# Patient Record
Sex: MALE | Race: BLACK OR AFRICAN AMERICAN | Employment: UNEMPLOYED | URBAN - METROPOLITAN AREA
[De-identification: names, ages, dates, MRNs, and addresses within clinical notes are randomized per-mention and may not be internally consistent; named-entity substitution may affect disease eponyms.]

---

## 2021-06-27 ENCOUNTER — HOSPITAL ENCOUNTER (EMERGENCY)
Age: 46
Discharge: HOME | End: 2021-06-27
Attending: EMERGENCY MEDICINE

## 2021-06-27 ENCOUNTER — APPOINTMENT (OUTPATIENT)
Dept: CT IMAGING | Age: 46
End: 2021-06-27
Attending: EMERGENCY MEDICINE

## 2021-06-27 VITALS
BODY MASS INDEX: 24.8 KG/M2 | WEIGHT: 140 LBS | HEIGHT: 63 IN | HEART RATE: 99 BPM | OXYGEN SATURATION: 100 % | RESPIRATION RATE: 18 BRPM | SYSTOLIC BLOOD PRESSURE: 140 MMHG | TEMPERATURE: 98.4 F | DIASTOLIC BLOOD PRESSURE: 101 MMHG

## 2021-06-27 DIAGNOSIS — K52.9 COLITIS: Primary | ICD-10-CM

## 2021-06-27 DIAGNOSIS — R79.89 ABNORMAL LIVER FUNCTION TESTS: ICD-10-CM

## 2021-06-27 DIAGNOSIS — Z78.9 ALCOHOL USE: ICD-10-CM

## 2021-06-27 LAB
ALBUMIN SERPL-MCNC: 4.5 G/DL (ref 3.4–5)
ALBUMIN/GLOB SERPL: 1.1 {RATIO} (ref 0.8–1.7)
ALP SERPL-CCNC: 123 U/L (ref 45–117)
ALT SERPL-CCNC: 34 U/L (ref 16–61)
ANION GAP SERPL CALC-SCNC: 7 MMOL/L (ref 3–18)
APPEARANCE UR: CLEAR
APTT PPP: 24.4 SEC (ref 23–36.4)
AST SERPL-CCNC: 45 U/L (ref 10–38)
BACTERIA URNS QL MICRO: ABNORMAL /HPF
BASOPHILS # BLD: 0 K/UL (ref 0–0.1)
BASOPHILS NFR BLD: 1 % (ref 0–2)
BILIRUB DIRECT SERPL-MCNC: 0.4 MG/DL (ref 0–0.2)
BILIRUB SERPL-MCNC: 1.4 MG/DL (ref 0.2–1)
BILIRUB UR QL: NEGATIVE
BUN SERPL-MCNC: 7 MG/DL (ref 7–18)
BUN/CREAT SERPL: 8 (ref 12–20)
CALCIUM SERPL-MCNC: 8.8 MG/DL (ref 8.5–10.1)
CHLORIDE SERPL-SCNC: 108 MMOL/L (ref 100–111)
CO2 SERPL-SCNC: 27 MMOL/L (ref 21–32)
COLOR UR: YELLOW
CREAT SERPL-MCNC: 0.84 MG/DL (ref 0.6–1.3)
DIFFERENTIAL METHOD BLD: NORMAL
EOSINOPHIL # BLD: 0 K/UL (ref 0–0.4)
EOSINOPHIL NFR BLD: 0 % (ref 0–5)
EPITH CASTS URNS QL MICRO: ABNORMAL /LPF (ref 0–5)
ERYTHROCYTE [DISTWIDTH] IN BLOOD BY AUTOMATED COUNT: 13.4 % (ref 11.6–14.5)
ETHANOL SERPL-MCNC: 380 MG/DL (ref 0–3)
GLOBULIN SER CALC-MCNC: 4 G/DL (ref 2–4)
GLUCOSE BLD STRIP.AUTO-MCNC: 103 MG/DL (ref 70–110)
GLUCOSE SERPL-MCNC: 87 MG/DL (ref 74–99)
GLUCOSE UR STRIP.AUTO-MCNC: NEGATIVE MG/DL
HCT VFR BLD AUTO: 42.2 % (ref 36–48)
HGB BLD-MCNC: 14.8 G/DL (ref 13–16)
HGB UR QL STRIP: NEGATIVE
INR PPP: 1 (ref 0.8–1.2)
KETONES UR QL STRIP.AUTO: ABNORMAL MG/DL
LEUKOCYTE ESTERASE UR QL STRIP.AUTO: NEGATIVE
LIPASE SERPL-CCNC: 207 U/L (ref 73–393)
LYMPHOCYTES # BLD: 3.1 K/UL (ref 0.9–3.6)
LYMPHOCYTES NFR BLD: 51 % (ref 21–52)
MAGNESIUM SERPL-MCNC: 2.1 MG/DL (ref 1.6–2.6)
MCH RBC QN AUTO: 31.9 PG (ref 24–34)
MCHC RBC AUTO-ENTMCNC: 35.1 G/DL (ref 31–37)
MCV RBC AUTO: 90.9 FL (ref 74–97)
MONOCYTES # BLD: 0.4 K/UL (ref 0.05–1.2)
MONOCYTES NFR BLD: 7 % (ref 3–10)
NEUTS SEG # BLD: 2.5 K/UL (ref 1.8–8)
NEUTS SEG NFR BLD: 41 % (ref 40–73)
NITRITE UR QL STRIP.AUTO: NEGATIVE
PH UR STRIP: 5 [PH] (ref 5–8)
PLATELET # BLD AUTO: 177 K/UL (ref 135–420)
PMV BLD AUTO: 9.9 FL (ref 9.2–11.8)
POTASSIUM SERPL-SCNC: 3.9 MMOL/L (ref 3.5–5.5)
PROT SERPL-MCNC: 8.5 G/DL (ref 6.4–8.2)
PROT UR STRIP-MCNC: 30 MG/DL
PROTHROMBIN TIME: 12.7 SEC (ref 11.5–15.2)
RBC # BLD AUTO: 4.64 M/UL (ref 4.35–5.65)
RBC #/AREA URNS HPF: NEGATIVE /HPF (ref 0–5)
SODIUM SERPL-SCNC: 142 MMOL/L (ref 136–145)
SP GR UR REFRACTOMETRY: 1.02 (ref 1–1.03)
UROBILINOGEN UR QL STRIP.AUTO: 0.2 EU/DL (ref 0.2–1)
WBC # BLD AUTO: 6.1 K/UL (ref 4.6–13.2)
WBC URNS QL MICRO: ABNORMAL /HPF (ref 0–5)

## 2021-06-27 PROCEDURE — 83690 ASSAY OF LIPASE: CPT

## 2021-06-27 PROCEDURE — 96375 TX/PRO/DX INJ NEW DRUG ADDON: CPT

## 2021-06-27 PROCEDURE — 96365 THER/PROPH/DIAG IV INF INIT: CPT

## 2021-06-27 PROCEDURE — 82077 ASSAY SPEC XCP UR&BREATH IA: CPT

## 2021-06-27 PROCEDURE — 74177 CT ABD & PELVIS W/CONTRAST: CPT

## 2021-06-27 PROCEDURE — 83735 ASSAY OF MAGNESIUM: CPT

## 2021-06-27 PROCEDURE — 85610 PROTHROMBIN TIME: CPT

## 2021-06-27 PROCEDURE — 74011250636 HC RX REV CODE- 250/636: Performed by: EMERGENCY MEDICINE

## 2021-06-27 PROCEDURE — 82962 GLUCOSE BLOOD TEST: CPT

## 2021-06-27 PROCEDURE — 99284 EMERGENCY DEPT VISIT MOD MDM: CPT

## 2021-06-27 PROCEDURE — 85025 COMPLETE CBC W/AUTO DIFF WBC: CPT

## 2021-06-27 PROCEDURE — 81001 URINALYSIS AUTO W/SCOPE: CPT

## 2021-06-27 PROCEDURE — 74011000250 HC RX REV CODE- 250: Performed by: EMERGENCY MEDICINE

## 2021-06-27 PROCEDURE — 85730 THROMBOPLASTIN TIME PARTIAL: CPT

## 2021-06-27 PROCEDURE — 80048 BASIC METABOLIC PNL TOTAL CA: CPT

## 2021-06-27 PROCEDURE — 96366 THER/PROPH/DIAG IV INF ADDON: CPT

## 2021-06-27 PROCEDURE — 80076 HEPATIC FUNCTION PANEL: CPT

## 2021-06-27 PROCEDURE — 74011000636 HC RX REV CODE- 636: Performed by: EMERGENCY MEDICINE

## 2021-06-27 RX ORDER — METFORMIN HYDROCHLORIDE 500 MG/1
TABLET ORAL 2 TIMES DAILY WITH MEALS
COMMUNITY

## 2021-06-27 RX ORDER — ONDANSETRON 4 MG/1
4 TABLET, FILM COATED ORAL
Qty: 12 TABLET | Refills: 0 | Status: SHIPPED | OUTPATIENT
Start: 2021-06-27

## 2021-06-27 RX ORDER — PANTOPRAZOLE SODIUM 20 MG/1
20 TABLET, DELAYED RELEASE ORAL DAILY
COMMUNITY

## 2021-06-27 RX ORDER — SERTRALINE HYDROCHLORIDE 100 MG/1
TABLET, FILM COATED ORAL DAILY
COMMUNITY

## 2021-06-27 RX ORDER — MORPHINE SULFATE 4 MG/ML
4 INJECTION INTRAVENOUS
Status: COMPLETED | OUTPATIENT
Start: 2021-06-27 | End: 2021-06-27

## 2021-06-27 RX ORDER — CIPROFLOXACIN 500 MG/1
500 TABLET ORAL 2 TIMES DAILY
Qty: 14 TABLET | Refills: 0 | Status: SHIPPED | OUTPATIENT
Start: 2021-06-27 | End: 2021-07-04

## 2021-06-27 RX ORDER — DICYCLOMINE HYDROCHLORIDE 10 MG/1
20 CAPSULE ORAL
Qty: 50 CAPSULE | Refills: 0 | Status: SHIPPED | OUTPATIENT
Start: 2021-06-27

## 2021-06-27 RX ORDER — ATORVASTATIN CALCIUM 10 MG/1
TABLET, FILM COATED ORAL DAILY
COMMUNITY

## 2021-06-27 RX ORDER — ONDANSETRON 2 MG/ML
4 INJECTION INTRAMUSCULAR; INTRAVENOUS
Status: COMPLETED | OUTPATIENT
Start: 2021-06-27 | End: 2021-06-27

## 2021-06-27 RX ADMIN — IOPAMIDOL 100 ML: 612 INJECTION, SOLUTION INTRAVENOUS at 21:12

## 2021-06-27 RX ADMIN — FOLIC ACID: 5 INJECTION, SOLUTION INTRAMUSCULAR; INTRAVENOUS; SUBCUTANEOUS at 20:21

## 2021-06-27 RX ADMIN — MORPHINE SULFATE 4 MG: 4 INJECTION INTRAVENOUS at 20:08

## 2021-06-27 RX ADMIN — ONDANSETRON 4 MG: 2 INJECTION INTRAMUSCULAR; INTRAVENOUS at 20:06

## 2021-06-27 NOTE — ED TRIAGE NOTES
Pt states \" My stomach has been swelling and I am having a hard time breathing and I am diabetic with high blood pressure. I am new to the area and I need help. \"

## 2021-07-03 NOTE — ED PROVIDER NOTES
EMERGENCY DEPARTMENT HISTORY AND PHYSICAL EXAM    Date: 6/27/2021  Patient Name: Agnes Collier    History of Presenting Illness       Chief Complaint   Patient presents with    Abdominal Pain         History Provided By: Patient    Additional History (Context):   7:42 PM  Agnes Collier is a 48 y.o. male with PMHX of hypertension, diabetes, reflux, tobacco use who presents to the emergency department C/O belly pain. Patient describes a 10 out of 10 lower abdominal pain described as aching and tightness in the pelvic region. It does not radiate or aggravate or alleviate with food or drink or any kind of factors. He notices movement makes him feel uncomfortable but otherwise unremarkable. He is nauseous without vomiting. He had a few bouts of loose stools but denies any blood or melena or cathy diarrhea. He does acknowledge drinking alcohol with with some regularity in fact he was drinking tonight in order alleviate some of the pain. Social History  Acknowledges smoking both cigarettes and marijuana as well as drinking alcohol but denies any other illegal drugs. Family History  Negative for premature heart disease or colon cancer. Positive for high blood pressure and diabetes in his family      PCP: None    Current Outpatient Medications   Medication Sig Dispense Refill    LOSARTAN PO Take  by mouth.  sertraline (Zoloft) 100 mg tablet Take  by mouth daily.  atorvastatin (LIPITOR) 10 mg tablet Take  by mouth daily.  metFORMIN (GLUCOPHAGE) 500 mg tablet Take  by mouth two (2) times daily (with meals).  pantoprazole (Protonix) 20 mg tablet Take 20 mg by mouth daily.  ciprofloxacin HCl (Cipro) 500 mg tablet Take 1 Tablet by mouth two (2) times a day for 7 days. 14 Tablet 0    ondansetron hcl (Zofran) 4 mg tablet Take 1 Tablet by mouth every eight (8) hours as needed for Nausea.  12 Tablet 0    dicyclomine (BENTYL) 10 mg capsule Take 2 Capsules by mouth four (4) times daily as needed for Abdominal Cramps. 50 Capsule 0       Past History     Past Medical History:  Past Medical History:   Diagnosis Date    Diabetes ([address]     Gastric reflux     Hx of colonoscopy     Hypertension        Past Surgical History:  History reviewed. No pertinent surgical history. Family History:  History reviewed. No pertinent family history. Social History:  Social History     Tobacco Use    Smoking status: Current Every Day Smoker    Smokeless tobacco: Never Used   Substance Use Topics    Alcohol use: Yes     Alcohol/week: 2.0 standard drinks     Types: 2 Cans of beer per week    Drug use: Yes     Types: Marijuana       Allergies:  No Known Allergies      Review of Systems   Review of Systems   Gastrointestinal: Positive for abdominal pain and nausea. Negative for blood in stool and constipation. Endocrine: Negative for polydipsia and polyuria. Hematological: Does not bruise/bleed easily. All other systems reviewed and are negative. Physical Exam     Vitals:    06/27/21 1900   BP: (!) 140/101   Pulse: 99   Resp: 18   Temp: 98.4 °F (36.9 °C)   SpO2: 100%   Weight: 63.5 kg (140 lb)   Height: 5' 3\" (1.6 m)     Physical Exam  Vitals and nursing note reviewed. Constitutional:       General: He is not in acute distress. Appearance: He is well-developed. He is not diaphoretic. HENT:      Head: Normocephalic and atraumatic. Eyes:      General: No scleral icterus. Extraocular Movements:      Right eye: Nystagmus present. Normal extraocular motion. Left eye: Nystagmus present. Normal extraocular motion. Conjunctiva/sclera:      Right eye: Right conjunctiva is injected. Left eye: Left conjunctiva is injected. Pupils: Pupils are equal, round, and reactive to light. Neck:      Trachea: No tracheal deviation. Cardiovascular:      Rate and Rhythm: Normal rate and regular rhythm. Heart sounds: Normal heart sounds.    Pulmonary:      Effort: Pulmonary effort is normal. No respiratory distress. Breath sounds: Normal breath sounds. No stridor. Abdominal:      General: Bowel sounds are normal. There is no distension. Palpations: Abdomen is soft. Tenderness: There is no abdominal tenderness. There is no rebound. Musculoskeletal:         General: No tenderness. Normal range of motion. Cervical back: Normal range of motion and neck supple. Comments: Grossly unremarkable without abnormalities   Skin:     General: Skin is warm and dry. Capillary Refill: Capillary refill takes less than 2 seconds. Findings: No erythema or rash. Neurological:      Mental Status: He is alert and oriented to person, place, and time. GCS: GCS eye subscore is 4. GCS verbal subscore is 5. GCS motor subscore is 6. Cranial Nerves: No cranial nerve deficit. Motor: No weakness. Psychiatric:         Mood and Affect: Mood normal.         Behavior: Behavior normal.         Thought Content:  Thought content normal.         Judgment: Judgment normal.       Diagnostic Study Results     Labs -    Lab Results        Contains abnormal data URINALYSIS W/ RFLX MICROSCOPIC (Final result)   Component (Lab Inquiry)  Collection Time Result Time COLOR APPRN SPGRU KENNY PROTU GLUCU KETU BILU BLDU UROU   06/27/21 19:16:00 06/27/21 19:45:45 YELLOW CLEAR 1.017 5.0 30Abnormal  Negative TRACEAbnormal  Negative Negative 0.2       Collection Time Result Time ROMY LEUKU   06/27/21 19:16:00 06/27/21 19:45:45 Negative Negative       Final result                        Contains abnormal data URINE MICROSCOPIC ONLY (Final result)   Component (Lab Inquiry)  Collection Time Result Time WBCU RBCU EPSU BACTU   06/27/21 19:16:00 06/27/21 19:45:45 0 to 3 Negative FEW FEWAbnormal        Final result                        CBC WITH AUTOMATED DIFF (Final result)   Component (Lab Inquiry)  Collection Time Result Time WBC RBC HGB HCT MCV MCH MCHC RDW PLT MPLV   06/27/21 19:10:00 06/27/21 19:36:00 6.1 4.64 14.8 42.2 90.9 31.9 35.1 13.4 177 9.9       Collection Time Result Time GRANS LYMPH MONOS EOS BASOS ABG ABL ABM NANDO ABB   06/27/21 19:10:00 06/27/21 19:36:00 41 51 7 0 1 2.5 3.1 0.4 0.0 0.0       Collection Time Result Time DF   06/27/21 19:10:00 06/27/21 19:36:00 AUTOMATED         Final result                          Contains abnormal data METABOLIC PANEL, BASIC (Final result)   Component (Lab Inquiry)  Collection Time Result Time NA K CL CO2 AGAP GLU BUN CREA BUCR GFRAA   06/27/21 19:10:00 06/27/21 19:41:36 142 3.9 108 27 7 87 7 0.84 8Low  >60       Collection Time Result Time GFRNA CA   06/27/21 19:10:00 06/27/21 19:41:36 >60   (NOTE)   Estimated GFR . .. 8.8       Final result                        Contains abnormal data HEPATIC FUNCTION PANEL (Final result)   Component (Lab Inquiry)  Collection Time Result Time TP ALB GLOB AGRAT TBIL CBIL AP SGOT GPT   06/27/21 19:10:00 06/27/21 20:24:12 8.5High  4.5 4.0 1.1 1.4High  0.4High  123High  45High  34       Final result                        MAGNESIUM (Final result)   Component (Lab Inquiry)  Collection Time Result Time MG   06/27/21 19:10:00 06/27/21 20:24:12 2.1         Final result                          Contains critical data ETHYL ALCOHOL (Final result)   Component (Lab Inquiry)  Collection Time Result Time ALC   06/27/21 19:10:00 06/27/21 20:24:12 380   CALLED TO AND CORRECTL. [place] Melody Crofts High Panic         Final result                        PROTHROMBIN TIME + INR (Final result)   Component (Lab Inquiry)  Collection Time Result Time PTP INR   06/27/21 19:10:00 06/27/21 20:13:04 12.7 1.0   INR Therape. ..         Final result                          PTT (Final result)   Component (Lab Inquiry)  Collection Time Result Time APTT   06/27/21 19:10:00 06/27/21 20:13:04 24.4         Final result                          LIPASE (Final result)   Component (Lab Inquiry)  Collection Time Result Time LPSE   06/27/21 19:10:00 06/27/21 20:45:38 207         Final result           GLUCOSE, POC (Final result)   Component (Lab Inquiry)  Collection Time Result Time GULPOC   06/27/21 19:03:00 06/27/21 19:04:40 103   (NOTE)   The FDA has in. .. Radiologic Studies -   CT ABD PELV W CONT   Final Result      There is mild colonic wall thickening of the transverse and descending colon   which may reflect under distention versus colitis. Mild wall thickening of the antrum of the stomach which may reflect peptic   disease. Colonic diverticulosis. L5 pars defects bilaterally with grade 1 anterolisthesis. CT Results  (Last 48 hours)    None        CXR Results  (Last 48 hours)    None          Medications given in the ED-  Medications   morphine injection 4 mg (4 mg IntraVENous Given 6/27/21 2008)   ondansetron (ZOFRAN) injection 4 mg (4 mg IntraVENous Given 6/27/21 2006)   0.9% sodium chloride 1,000 mL with mvi (adult no. 4 with vit K) 10 mL, thiamine 084 mg, folic acid 1 mg infusion ( IntraVENous IV Completed 6/27/21 2325)   iopamidoL (ISOVUE 300) 61 % contrast injection  mL (100 mL IntraVENous Given 6/27/21 2112)         Medical Decision Making   I am the first provider for this patient. I reviewed the vital signs, available nursing notes, past medical history, past surgical history, family history and social history. Vital Signs-Reviewed the patient's vital signs. Pulse Oximetry Analysis - 100% on room air    Cardiac Monitor:  Rate: 95 bpm  Rhythm: Sinus rhythm    Records Reviewed: NURSING NOTES AND PREVIOUS MEDICAL RECORDS    Provider Notes (Medical Decision Making): Anxious nervous gentleman with epigastric pain pelvic left lower quadrant pain. He has conjunctival injection nystagmus smells of alcohol likely from the reason for his gastritis. Given his age and diabetes location of pain CAT scan was ordered which showed some evidence of colitis but this more likely the underdistention as opposed to true pathology.   He was given prescription medications advised to curtail his indulgence that he was given information for obtaining new doctors in the area. It is unlikely this represents ACS pulmonary embolism perforation. He was given forewarning regarding the changes in his liver function associated with regular alcohol consumption    Procedures:  Procedures    ED Course:   7:42 PM: Initial assessment performed. The patients presenting problems have been discussed, and they are in agreement with the care plan formulated and outlined with them. I have encouraged them to ask questions as they arise throughout their visit. Diagnosis and Disposition       DISCHARGE NOTE:  11:04 PM  Polly Naranjo  results have been reviewed with him. He has been counseled regarding his diagnosis, treatment, and plan. He verbally conveys understanding and agreement of the signs, symptoms, diagnosis, treatment and prognosis and additionally agrees to follow up as discussed. He also agrees with the care-plan and conveys that all of his questions have been answered. I have also provided discharge instructions for him that include: educational information regarding their diagnosis and treatment, and list of reasons why they would want to return to the ED prior to their follow-up appointment, should his condition change. He has been provided with education for proper emergency department utilization. CLINICAL IMPRESSION:    1. Colitis    2. Alcohol use    3. Abnormal liver function tests        PLAN:  1. D/C Home  2. Discharge Medication List as of 6/27/2021 11:10 PM      START taking these medications    Details   ciprofloxacin HCl (Cipro) 500 mg tablet Take 1 Tablet by mouth two (2) times a day for 7 days. , Normal, Disp-14 Tablet, R-0      ondansetron hcl (Zofran) 4 mg tablet Take 1 Tablet by mouth every eight (8) hours as needed for Nausea., Normal, Disp-12 Tablet, R-0      dicyclomine (BENTYL) 10 mg capsule Take 2 Capsules by mouth four (4) times daily as needed for Abdominal Cramps., Normal, Disp-50 Capsule, R-0         CONTINUE these medications which have NOT CHANGED    Details   LOSARTAN PO Take  by mouth., Historical Med      sertraline (Zoloft) 100 mg tablet Take  by mouth daily. , Historical Med      atorvastatin (LIPITOR) 10 mg tablet Take  by mouth daily. , Historical Med      metFORMIN (GLUCOPHAGE) 500 mg tablet Take  by mouth two (2) times daily (with meals). , Historical Med      pantoprazole (Protonix) 20 mg tablet Take 20 mg by mouth daily. , Historical Med           3. Follow-up Information     Follow up With Specialties Details Why Contact Info    [address] [facility]  In 3 days  [address]  In 3 days  [facility] [address] [phone]    [facility] EMERGENCY DEPT Emergency Medicine  As needed [address] [address]  [phone]        _______________________________    This note was partially transcribed via voice recognition software. Although efforts have been made to catch any discrepancies, it may contain sound alike words, grammatical errors, or nonsensical words.

## 2021-07-13 ENCOUNTER — APPOINTMENT (OUTPATIENT)
Dept: CT IMAGING | Age: 46
End: 2021-07-13
Attending: EMERGENCY MEDICINE

## 2021-07-13 ENCOUNTER — HOSPITAL ENCOUNTER (EMERGENCY)
Age: 46
Discharge: HOME | End: 2021-07-13
Attending: EMERGENCY MEDICINE

## 2021-07-13 VITALS
WEIGHT: 140 LBS | BODY MASS INDEX: 24.8 KG/M2 | RESPIRATION RATE: 16 BRPM | DIASTOLIC BLOOD PRESSURE: 99 MMHG | HEART RATE: 82 BPM | HEIGHT: 63 IN | TEMPERATURE: 97.8 F | SYSTOLIC BLOOD PRESSURE: 142 MMHG | OXYGEN SATURATION: 100 %

## 2021-07-13 DIAGNOSIS — R10.9 LEFT FLANK PAIN: Primary | ICD-10-CM

## 2021-07-13 DIAGNOSIS — M54.9 MUSCULOSKELETAL BACK PAIN: ICD-10-CM

## 2021-07-13 DIAGNOSIS — F10.10 ALCOHOL ABUSE: ICD-10-CM

## 2021-07-13 LAB
ALBUMIN SERPL-MCNC: 3.5 G/DL (ref 3.4–5)
ALBUMIN/GLOB SERPL: 0.9 {RATIO} (ref 0.8–1.7)
ALP SERPL-CCNC: 115 U/L (ref 45–117)
ALT SERPL-CCNC: 32 U/L (ref 16–61)
AMPHET UR QL SCN: NEGATIVE
ANION GAP SERPL CALC-SCNC: 12 MMOL/L (ref 3–18)
APPEARANCE UR: CLEAR
AST SERPL-CCNC: 46 U/L (ref 10–38)
BARBITURATES UR QL SCN: NEGATIVE
BASOPHILS # BLD: 0 K/UL (ref 0–0.1)
BASOPHILS NFR BLD: 1 % (ref 0–2)
BENZODIAZ UR QL: NEGATIVE
BILIRUB SERPL-MCNC: 0.7 MG/DL (ref 0.2–1)
BILIRUB UR QL: NEGATIVE
BUN SERPL-MCNC: 9 MG/DL (ref 7–18)
BUN/CREAT SERPL: 9 (ref 12–20)
CALCIUM SERPL-MCNC: 8.4 MG/DL (ref 8.5–10.1)
CANNABINOIDS UR QL SCN: NEGATIVE
CHLORIDE SERPL-SCNC: 112 MMOL/L (ref 100–111)
CK SERPL-CCNC: 455 U/L (ref 39–308)
CO2 SERPL-SCNC: 21 MMOL/L (ref 21–32)
COCAINE UR QL SCN: NEGATIVE
COLOR UR: YELLOW
CREAT SERPL-MCNC: 0.99 MG/DL (ref 0.6–1.3)
DIFFERENTIAL METHOD BLD: ABNORMAL
EOSINOPHIL # BLD: 0.1 K/UL (ref 0–0.4)
EOSINOPHIL NFR BLD: 1 % (ref 0–5)
ERYTHROCYTE [DISTWIDTH] IN BLOOD BY AUTOMATED COUNT: 14.4 % (ref 11.6–14.5)
GLOBULIN SER CALC-MCNC: 3.7 G/DL (ref 2–4)
GLUCOSE BLD STRIP.AUTO-MCNC: 117 MG/DL (ref 70–110)
GLUCOSE SERPL-MCNC: 122 MG/DL (ref 74–99)
GLUCOSE UR STRIP.AUTO-MCNC: NEGATIVE MG/DL
HCT VFR BLD AUTO: 36.4 % (ref 36–48)
HDSCOM,HDSCOM: NORMAL
HGB BLD-MCNC: 12.6 G/DL (ref 13–16)
HGB UR QL STRIP: NEGATIVE
KETONES UR QL STRIP.AUTO: NEGATIVE MG/DL
LEUKOCYTE ESTERASE UR QL STRIP.AUTO: NEGATIVE
LIPASE SERPL-CCNC: 365 U/L (ref 73–393)
LYMPHOCYTES # BLD: 2.7 K/UL (ref 0.9–3.6)
LYMPHOCYTES NFR BLD: 55 % (ref 21–52)
MCH RBC QN AUTO: 31.8 PG (ref 24–34)
MCHC RBC AUTO-ENTMCNC: 34.6 G/DL (ref 31–37)
MCV RBC AUTO: 91.9 FL (ref 74–97)
METHADONE UR QL: NEGATIVE
MONOCYTES # BLD: 0.5 K/UL (ref 0.05–1.2)
MONOCYTES NFR BLD: 11 % (ref 3–10)
NEUTS SEG # BLD: 1.6 K/UL (ref 1.8–8)
NEUTS SEG NFR BLD: 32 % (ref 40–73)
NITRITE UR QL STRIP.AUTO: NEGATIVE
OPIATES UR QL: NEGATIVE
PCP UR QL: NEGATIVE
PH UR STRIP: 5.5 [PH] (ref 5–8)
PLATELET # BLD AUTO: 166 K/UL (ref 135–420)
PMV BLD AUTO: 10 FL (ref 9.2–11.8)
POTASSIUM SERPL-SCNC: 3.7 MMOL/L (ref 3.5–5.5)
PROT SERPL-MCNC: 7.2 G/DL (ref 6.4–8.2)
PROT UR STRIP-MCNC: NEGATIVE MG/DL
RBC # BLD AUTO: 3.96 M/UL (ref 4.35–5.65)
SODIUM SERPL-SCNC: 145 MMOL/L (ref 136–145)
SP GR UR REFRACTOMETRY: 1.01 (ref 1–1.03)
UROBILINOGEN UR QL STRIP.AUTO: 1 EU/DL (ref 0.2–1)
WBC # BLD AUTO: 4.9 K/UL (ref 4.6–13.2)

## 2021-07-13 PROCEDURE — 96365 THER/PROPH/DIAG IV INF INIT: CPT

## 2021-07-13 PROCEDURE — 82550 ASSAY OF CK (CPK): CPT

## 2021-07-13 PROCEDURE — 81003 URINALYSIS AUTO W/O SCOPE: CPT

## 2021-07-13 PROCEDURE — 82962 GLUCOSE BLOOD TEST: CPT

## 2021-07-13 PROCEDURE — 74011250636 HC RX REV CODE- 250/636: Performed by: EMERGENCY MEDICINE

## 2021-07-13 PROCEDURE — 74176 CT ABD & PELVIS W/O CONTRAST: CPT

## 2021-07-13 PROCEDURE — 80053 COMPREHEN METABOLIC PANEL: CPT

## 2021-07-13 PROCEDURE — 74011000250 HC RX REV CODE- 250: Performed by: EMERGENCY MEDICINE

## 2021-07-13 PROCEDURE — 85025 COMPLETE CBC W/AUTO DIFF WBC: CPT

## 2021-07-13 PROCEDURE — 99285 EMERGENCY DEPT VISIT HI MDM: CPT

## 2021-07-13 PROCEDURE — 83690 ASSAY OF LIPASE: CPT

## 2021-07-13 PROCEDURE — 80307 DRUG TEST PRSMV CHEM ANLYZR: CPT

## 2021-07-13 PROCEDURE — 96375 TX/PRO/DX INJ NEW DRUG ADDON: CPT

## 2021-07-13 RX ORDER — MORPHINE SULFATE 2 MG/ML
2 INJECTION, SOLUTION INTRAMUSCULAR; INTRAVENOUS
Status: COMPLETED | OUTPATIENT
Start: 2021-07-13 | End: 2021-07-13

## 2021-07-13 RX ORDER — METHOCARBAMOL 500 MG/1
1000 TABLET, FILM COATED ORAL 3 TIMES DAILY
Qty: 30 TABLET | Refills: 0 | Status: SHIPPED | OUTPATIENT
Start: 2021-07-13

## 2021-07-13 RX ADMIN — SODIUM CHLORIDE 1000 ML: 900 INJECTION, SOLUTION INTRAVENOUS at 03:45

## 2021-07-13 RX ADMIN — MORPHINE SULFATE 2 MG: 2 INJECTION, SOLUTION INTRAMUSCULAR; INTRAVENOUS at 04:35

## 2021-07-13 RX ADMIN — FOLIC ACID: 5 INJECTION, SOLUTION INTRAMUSCULAR; INTRAVENOUS; SUBCUTANEOUS at 04:31

## 2021-07-13 NOTE — ED PROVIDER NOTES
EMERGENCY DEPARTMENT HISTORY AND PHYSICAL EXAM    Date: 7/13/2021  Patient Name: Manas Krishnamurthy    History of Presenting Illness     Chief Complaint   Patient presents with    Flank Pain         History Provided By: Patient and EMS    Additional History (Context):   3:28 AM  Manas Krishnamurthy is a 48 y.o. male with PMHX of hypertension, diabetes, reflux who presents to the emergency department C/O flank pain. Patient was brought in by EMS who originally called for patient with chest pain. I discovered gentleman with flank pain rating to his groin with urinary urgency but and unable to void. He was sent home cardiac monitor which showed normal sinus rhythm without tachycardia or ectopy or ST segment changes. He was given IV fluids Toradol 15 mg x 2 and Zofran during transport. He is tearful and anxious with left flank pain ranks 10 out of 10. Pain is sharp radiating from flank to groin without increasing or decreasing factors. I saw the same gentleman a few weeks ago on June 27 with similar symptoms and was discharged with diagnosis of colitis after CT showed thickening of transverse and descending colon had an alcohol level of 380. Additionally gastritis findings on CT. Patient denies any chest pain shortness of breath or nausea vomiting. Social History  Patient acknowledges smoking cigarettes and drinking alcohol regularly. Family History  Negative for premature heart disease      PCP: None    Current Outpatient Medications   Medication Sig Dispense Refill    methocarbamoL (Robaxin) 500 mg tablet Take 2 Tablets by mouth three (3) times daily. 30 Tablet 0    LOSARTAN PO Take  by mouth.  sertraline (Zoloft) 100 mg tablet Take  by mouth daily.  atorvastatin (LIPITOR) 10 mg tablet Take  by mouth daily.  metFORMIN (GLUCOPHAGE) 500 mg tablet Take  by mouth two (2) times daily (with meals).  pantoprazole (Protonix) 20 mg tablet Take 20 mg by mouth daily.       ondansetron hcl (Zofran) 4 mg tablet Take 1 Tablet by mouth every eight (8) hours as needed for Nausea. 12 Tablet 0    dicyclomine (BENTYL) 10 mg capsule Take 2 Capsules by mouth four (4) times daily as needed for Abdominal Cramps. 50 Capsule 0       Past History     Past Medical History:  Past Medical History:   Diagnosis Date    Diabetes ([address]     Gastric reflux     Hx of colonoscopy     Hypertension        Past Surgical History:  History reviewed. No pertinent surgical history. Family History:  History reviewed. No pertinent family history. Social History:  Social History     Tobacco Use    Smoking status: Current Every Day Smoker    Smokeless tobacco: Never Used   Substance Use Topics    Alcohol use: Yes     Alcohol/week: 2.0 standard drinks     Types: 2 Cans of beer per week    Drug use: Yes     Types: Marijuana       Allergies: Allergies   Allergen Reactions    Garlic Other (comments)         Review of Systems   Review of Systems   Constitutional: Negative. Respiratory: Negative for shortness of breath. Cardiovascular: Negative for chest pain. Genitourinary: Positive for flank pain and urgency. Negative for dysuria and hematuria. Urine is \"dark-colored\"   Musculoskeletal: Positive for back pain. Psychiatric/Behavioral:        Daily alcohol and marijuana drug use   All other systems reviewed and are negative. Physical Exam     Vitals:    07/13/21 0317 07/13/21 0400 07/13/21 0430 07/13/21 0459   BP:  130/79 (!) 142/99    Pulse:    82   Resp:    16   Temp:       SpO2: 98% 97% 98% 100%   Weight:       Height:         Physical Exam  Vitals and nursing note reviewed. Constitutional:       General: He is not in acute distress. Appearance: He is well-developed. He is not diaphoretic. HENT:      Head: Normocephalic and atraumatic. Eyes:      General: No scleral icterus. Extraocular Movements:      Right eye: Nystagmus present. Normal extraocular motion. Left eye: Nystagmus present.  Normal extraocular motion. Conjunctiva/sclera:      Right eye: Right conjunctiva is injected. Left eye: Left conjunctiva is injected. Pupils: Pupils are equal, round, and reactive to light. Neck:      Trachea: No tracheal deviation. Cardiovascular:      Rate and Rhythm: Normal rate and regular rhythm. Heart sounds: Normal heart sounds. Pulmonary:      Effort: Pulmonary effort is normal. No respiratory distress. Breath sounds: Normal breath sounds. No stridor. Abdominal:      General: Bowel sounds are normal. There is no distension. Palpations: Abdomen is soft. Tenderness: There is no abdominal tenderness. There is no rebound. Musculoskeletal:         General: No tenderness. Normal range of motion. Cervical back: Normal range of motion and neck supple. Comments: Grossly unremarkable without abnormalities   Skin:     General: Skin is warm and dry. Capillary Refill: Capillary refill takes less than 2 seconds. Findings: No erythema or rash. Neurological:      Mental Status: He is alert and oriented to person, place, and time. GCS: GCS eye subscore is 4. GCS verbal subscore is 5. GCS motor subscore is 6. Cranial Nerves: No cranial nerve deficit. Motor: No weakness, tremor, atrophy or seizure activity. Psychiatric:         Attention and Perception: Attention normal.         Mood and Affect: Mood is anxious. Affect is tearful. Speech: Speech is slurred. Behavior: Behavior normal. Behavior is cooperative. Thought Content: Thought content normal. Thought content is not paranoid. Thought content does not include homicidal or suicidal ideation.          Judgment: Judgment normal.       Diagnostic Study Results     Labs -  Recent Results (from the past 24 hour(s))   CBC WITH AUTOMATED DIFF    Collection Time: 07/13/21  3:14 AM   Result Value Ref Range    WBC 4.9 4.6 - 13.2 K/uL    RBC 3.96 (L) 4.35 - 5.65 M/uL    HGB 12.6 (L) 13.0 - 16.0 g/dL    HCT 36.4 36.0 - 48.0 %    MCV 91.9 74.0 - 97.0 FL    MCH 31.8 24.0 - 34.0 PG    MCHC 34.6 31.0 - 37.0 g/dL    RDW 14.4 11.6 - 14.5 %    PLATELET 576 696 - 415 K/uL    MPV 10.0 9.2 - 11.8 FL    NEUTROPHILS 32 (L) 40 - 73 %    LYMPHOCYTES 55 (H) 21 - 52 %    MONOCYTES 11 (H) 3 - 10 %    EOSINOPHILS 1 0 - 5 %    BASOPHILS 1 0 - 2 %    ABS. NEUTROPHILS 1.6 (L) 1.8 - 8.0 K/UL    ABS. LYMPHOCYTES 2.7 0.9 - 3.6 K/UL    ABS. MONOCYTES 0.5 0.05 - 1.2 K/UL    ABS. EOSINOPHILS 0.1 0.0 - 0.4 K/UL    ABS. BASOPHILS 0.0 0.0 - 0.1 K/UL    DF AUTOMATED     METABOLIC PANEL, COMPREHENSIVE    Collection Time: 07/13/21  3:14 AM   Result Value Ref Range    Sodium 145 136 - 145 mmol/L    Potassium 3.7 3.5 - 5.5 mmol/L    Chloride 112 (H) 100 - 111 mmol/L    CO2 21 21 - 32 mmol/L    Anion gap 12 3.0 - 18 mmol/L    Glucose 122 (H) 74 - 99 mg/dL    BUN 9 7.0 - 18 MG/DL    Creatinine 0.99 0.6 - 1.3 MG/DL    BUN/Creatinine ratio 9 (L) 12 - 20      GFR est AA >60 >60 ml/min/1.73m2    GFR est non-AA >60 >60 ml/min/1.73m2    Calcium 8.4 (L) 8.5 - 10.1 MG/DL    Bilirubin, total 0.7 0.2 - 1.0 MG/DL    ALT (SGPT) 32 16 - 61 U/L    AST (SGOT) 46 (H) 10 - 38 U/L    Alk.  phosphatase 115 45 - 117 U/L    Protein, total 7.2 6.4 - 8.2 g/dL    Albumin 3.5 3.4 - 5.0 g/dL    Globulin 3.7 2.0 - 4.0 g/dL    A-G Ratio 0.9 0.8 - 1.7     LIPASE    Collection Time: 07/13/21  3:14 AM   Result Value Ref Range    Lipase 365 73 - 393 U/L   CK    Collection Time: 07/13/21  3:14 AM   Result Value Ref Range     (H) 39 - 308 U/L   GLUCOSE, POC    Collection Time: 07/13/21  3:15 AM   Result Value Ref Range    Glucose (POC) 117 (H) 70 - 110 mg/dL   URINALYSIS W/ RFLX MICROSCOPIC    Collection Time: 07/13/21  4:00 AM   Result Value Ref Range    Color YELLOW      Appearance CLEAR      Specific gravity 1.010 1.005 - 1.030      pH (UA) 5.5 5.0 - 8.0      Protein Negative NEG mg/dL    Glucose Negative NEG mg/dL    Ketone Negative NEG mg/dL    Bilirubin Negative NEG      Blood Negative NEG      Urobilinogen 1.0 0.2 - 1.0 EU/dL    Nitrites Negative NEG      Leukocyte Esterase Negative NEG     DRUG SCREEN, URINE    Collection Time: 07/13/21  4:00 AM   Result Value Ref Range    BENZODIAZEPINES Negative NEG      BARBITURATES Negative NEG      THC (TH-CANNABINOL) Negative NEG      OPIATES Negative NEG      PCP(PHENCYCLIDINE) Negative NEG      COCAINE Negative NEG      AMPHETAMINES Negative NEG      METHADONE Negative NEG      HDSCOM (NOTE)         Radiologic Studies -   CT ABD PELV WO CONT   Final Result      No renal or ureteral calculi, no signs of obstructive uropathy. No bowel dilatation or acute inflammatory process. Hepatic steatosis. CT Results  (Last 48 hours)               07/13/21 0435  CT ABD PELV WO CONT Final result    Impression:      No renal or ureteral calculi, no signs of obstructive uropathy. No bowel dilatation or acute inflammatory process. Hepatic steatosis. Narrative:  EXAM: CT of the abdomen and pelvis       INDICATION: Left lower quadrant pain, nausea, left flank pain x2 days. COMPARISON: 6/27/2021       TECHNIQUE: Helical volumetric scanning through the abdomen and pelvis was   performed without intravenous contrast.  Axial, coronal and sagittal   reconstructions were created. One or more dose reduction techniques were used on   this CT: automated exposure control, adjustment of the mAs and/or kVp according   to patient size, and iterative reconstruction techniques. The specific   techniques used on this CT exam have been documented in the patient's electronic   medical record. Digital Imaging and Communications in Medicine (DICOM) format   image data are available to nonaffiliated external healthcare facilities or   entities on a secure, media free, reciprocally searchable basis with patient   authorization for at least a 12-month period after this study.                    _______________ FINDINGS:       LOWER CHEST: Within normal limits. LIVER, BILIARY: Liver is diffusely low density relative to skeletal muscle   compatible with hepatic steatosis, with focal greater degree of fatty   infiltration in segment 4 of the liver adjacent to falciform ligament. No   suspicious liver lesion. No biliary dilation. Gallbladder is unremarkable. PANCREAS: Normal.       SPLEEN: Normal.       ADRENALS: Normal.       KIDNEYS: Normal.       LYMPH NODES: No enlarged lymph nodes. GASTROINTESTINAL TRACT: No bowel dilation or wall thickening. Normal appendix. Previous left colon and gastric antrum wall thickening are now longer   appreciated. PELVIC ORGANS: Urinary bladder is nearly completely empty with moderate wall   thickening. VASCULATURE: Aortic calcific atherosclerosis is redemonstrated. No AAA. BONES: No acute or aggressive osseous abnormalities identified. Bilateral L5   pars defects with grade 1 L5-S1 anterolisthesis again noted. OTHER: None.       _______________               CXR Results  (Last 48 hours)    None          Medications given in the ED-  Medications   sodium chloride 0.9 % bolus infusion 1,000 mL (0 mL IntraVENous IV Completed 7/13/21 2590)   0.9% sodium chloride 1,000 mL with mvi (adult no. 4 with vit K) 10 mL, thiamine 395 mg, folic acid 1 mg infusion ( IntraVENous IV Completed 7/13/21 4039)   morphine injection 2 mg (2 mg IntraVENous Given 7/13/21 7064)         Medical Decision Making   I am the first provider for this patient. I reviewed the vital signs, available nursing notes, past medical history, past surgical history, family history and social history. Vital Signs-Reviewed the patient's vital signs.     Pulse Oximetry Analysis - 100% on room air    Cardiac Monitor:  Rate: 86 bpm  Rhythm: Sinus rhythm      Records Reviewed: NURSING NOTES AND PREVIOUS MEDICAL RECORDS    Provider Notes (Medical Decision Making):   Patient transported with presumptive diagnosis of kidney stone. Is unlikely this represents ACS or pulmonary embolism. CT was repeated to look for evidence of perforation or new kidney stones or vascular injury. After Toradol pain was relieved that M dose of morphine but nursing staff found him sleeping. Eventually he woke up and did ask for pain medication symptoms resolved after 2 mg of morphine. Patient discharge at 6 AM in good condition walking without assistance. Procedures:  Procedures    ED Course:   3:28 AM : Initial assessment performed. The patients presenting problems have been discussed, and they are in agreement with the care plan formulated and outlined with them. I have encouraged them to ask questions as they arise throughout their visit. Diagnosis and Disposition       DISCHARGE NOTE:  5:51 AM  Chilo Ramesh  results have been reviewed with him. He has been counseled regarding his diagnosis, treatment, and plan. He verbally conveys understanding and agreement of the signs, symptoms, diagnosis, treatment and prognosis and additionally agrees to follow up as discussed. He also agrees with the care-plan and conveys that all of his questions have been answered. I have also provided discharge instructions for him that include: educational information regarding their diagnosis and treatment, and list of reasons why they would want to return to the ED prior to their follow-up appointment, should his condition change. He has been provided with education for proper emergency department utilization. CLINICAL IMPRESSION:    1. Left flank pain    2. Alcohol abuse    3. Musculoskeletal back pain        PLAN:  1. D/C Home  2. Current Discharge Medication List      START taking these medications    Details   methocarbamoL (Robaxin) 500 mg tablet Take 2 Tablets by mouth three (3) times daily. Qty: 30 Tablet, Refills: 0  Start date: 7/13/2021           3.    Follow-up Information     Follow up With Specialties Details Why [address]  In 1 week  [id number] [facility]  [phone]        _______________________________    This note was partially transcribed via voice recognition software. Although efforts have been made to catch any discrepancies, it may contain sound alike words, grammatical errors, or nonsensical words.

## 2021-07-13 NOTE — ED TRIAGE NOTES
Patient c/o LLQ abdominal pain, nausea, and left sided flank pain x2 days.  30mg IV toradol and 4mg IV zofran administered by ems

## 2021-10-06 ENCOUNTER — APPOINTMENT (OUTPATIENT)
Dept: GENERAL RADIOLOGY | Age: 46
End: 2021-10-06
Attending: EMERGENCY MEDICINE

## 2021-10-06 ENCOUNTER — HOSPITAL ENCOUNTER (EMERGENCY)
Age: 46
Discharge: HOME | End: 2021-10-06
Attending: EMERGENCY MEDICINE

## 2021-10-06 VITALS
HEIGHT: 63 IN | RESPIRATION RATE: 18 BRPM | OXYGEN SATURATION: 98 % | HEART RATE: 90 BPM | SYSTOLIC BLOOD PRESSURE: 134 MMHG | DIASTOLIC BLOOD PRESSURE: 81 MMHG | TEMPERATURE: 98 F | WEIGHT: 140 LBS | BODY MASS INDEX: 24.8 KG/M2

## 2021-10-06 DIAGNOSIS — F10.920 ALCOHOLIC INTOXICATION WITHOUT COMPLICATION (HCC): ICD-10-CM

## 2021-10-06 DIAGNOSIS — M25.562 ACUTE PAIN OF LEFT KNEE: Primary | ICD-10-CM

## 2021-10-06 PROCEDURE — 99282 EMERGENCY DEPT VISIT SF MDM: CPT

## 2021-10-06 PROCEDURE — 74011250637 HC RX REV CODE- 250/637: Performed by: EMERGENCY MEDICINE

## 2021-10-06 PROCEDURE — 73564 X-RAY EXAM KNEE 4 OR MORE: CPT

## 2021-10-06 RX ORDER — ACETAMINOPHEN 325 MG/1
650 TABLET ORAL
Status: COMPLETED | OUTPATIENT
Start: 2021-10-06 | End: 2021-10-06

## 2021-10-06 RX ADMIN — ACETAMINOPHEN 650 MG: 325 TABLET ORAL at 03:50
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
## 2021-10-06 NOTE — ED TRIAGE NOTES
Pt states he was walking down steps when he felt a pop in his left knee. C/o pain to same, states he is unable to apply pressure to extremity.

## 2021-10-06 NOTE — ED NOTES
Knee immobilizer applied and crutches given. I have reviewed discharge instructions with the patient. The patient verbalized understanding.

## 2021-12-24 ENCOUNTER — APPOINTMENT (OUTPATIENT)
Dept: GENERAL RADIOLOGY | Age: 46
End: 2021-12-24
Attending: EMERGENCY MEDICINE

## 2021-12-24 ENCOUNTER — HOSPITAL ENCOUNTER (EMERGENCY)
Age: 46
Discharge: HOME | End: 2021-12-24
Attending: EMERGENCY MEDICINE

## 2021-12-24 VITALS
OXYGEN SATURATION: 97 % | HEIGHT: 68 IN | HEART RATE: 97 BPM | SYSTOLIC BLOOD PRESSURE: 115 MMHG | BODY MASS INDEX: 20.76 KG/M2 | DIASTOLIC BLOOD PRESSURE: 82 MMHG | WEIGHT: 137 LBS | RESPIRATION RATE: 18 BRPM | TEMPERATURE: 98.4 F

## 2021-12-24 DIAGNOSIS — M25.562 ACUTE PAIN OF LEFT KNEE: Primary | ICD-10-CM

## 2021-12-24 PROCEDURE — 74011250636 HC RX REV CODE- 250/636: Performed by: EMERGENCY MEDICINE

## 2021-12-24 PROCEDURE — 96372 THER/PROPH/DIAG INJ SC/IM: CPT

## 2021-12-24 PROCEDURE — 73564 X-RAY EXAM KNEE 4 OR MORE: CPT

## 2021-12-24 PROCEDURE — 99284 EMERGENCY DEPT VISIT MOD MDM: CPT

## 2021-12-24 RX ORDER — KETOROLAC TROMETHAMINE 30 MG/ML
60 INJECTION, SOLUTION INTRAMUSCULAR; INTRAVENOUS
Status: COMPLETED | OUTPATIENT
Start: 2021-12-24 | End: 2021-12-24

## 2021-12-24 RX ADMIN — KETOROLAC TROMETHAMINE 60 MG: 30 INJECTION, SOLUTION INTRAMUSCULAR at 04:28
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
## 2021-12-30 ENCOUNTER — HOSPITAL ENCOUNTER (EMERGENCY)
Age: 46
Discharge: LEFT BEFORE DISCHARGE | End: 2021-12-30

## 2021-12-30 VITALS
TEMPERATURE: 97.8 F | RESPIRATION RATE: 16 BRPM | DIASTOLIC BLOOD PRESSURE: 90 MMHG | HEART RATE: 98 BPM | SYSTOLIC BLOOD PRESSURE: 144 MMHG | OXYGEN SATURATION: 99 %

## 2021-12-30 NOTE — ED TRIAGE NOTES
\" I have a callous on both my big toes that hurt and some right knee pain that's been going on for 3 months. I wear a brace but it don't seem to help, my knee is acting super duper stupid tonight\".  Vss nad noted

## 2022-01-16 ENCOUNTER — HOSPITAL ENCOUNTER (EMERGENCY)
Age: 47
Discharge: HOME | End: 2022-01-16
Attending: EMERGENCY MEDICINE
Payer: MEDICAID

## 2022-01-16 ENCOUNTER — APPOINTMENT (OUTPATIENT)
Dept: CT IMAGING | Age: 47
End: 2022-01-16
Attending: EMERGENCY MEDICINE
Payer: MEDICAID

## 2022-01-16 ENCOUNTER — APPOINTMENT (OUTPATIENT)
Dept: GENERAL RADIOLOGY | Age: 47
End: 2022-01-16
Attending: EMERGENCY MEDICINE
Payer: MEDICAID

## 2022-01-16 VITALS
SYSTOLIC BLOOD PRESSURE: 156 MMHG | TEMPERATURE: 98.2 F | OXYGEN SATURATION: 98 % | RESPIRATION RATE: 18 BRPM | DIASTOLIC BLOOD PRESSURE: 101 MMHG | HEART RATE: 85 BPM

## 2022-01-16 DIAGNOSIS — M25.561 ACUTE PAIN OF RIGHT KNEE: ICD-10-CM

## 2022-01-16 DIAGNOSIS — F10.920 ALCOHOLIC INTOXICATION WITHOUT COMPLICATION (HCC): ICD-10-CM

## 2022-01-16 DIAGNOSIS — U07.1 COVID: Primary | ICD-10-CM

## 2022-01-16 LAB
ALBUMIN SERPL-MCNC: 4.3 G/DL (ref 3.4–5)
ALBUMIN/GLOB SERPL: 0.9 {RATIO} (ref 0.8–1.7)
ALP SERPL-CCNC: 157 U/L (ref 45–117)
ALT SERPL-CCNC: 93 U/L (ref 16–61)
ANION GAP SERPL CALC-SCNC: 7 MMOL/L (ref 3–18)
APAP SERPL-MCNC: <2 UG/ML (ref 10–30)
AST SERPL-CCNC: 225 U/L (ref 10–38)
BASOPHILS # BLD: 0 K/UL (ref 0–0.1)
BASOPHILS NFR BLD: 0 % (ref 0–2)
BILIRUB SERPL-MCNC: 0.7 MG/DL (ref 0.2–1)
BUN SERPL-MCNC: 6 MG/DL (ref 7–18)
BUN/CREAT SERPL: 8 (ref 12–20)
CALCIUM SERPL-MCNC: 8.9 MG/DL (ref 8.5–10.1)
CHLORIDE SERPL-SCNC: 109 MMOL/L (ref 100–111)
CO2 SERPL-SCNC: 25 MMOL/L (ref 21–32)
COVID-19 RAPID TEST, COVR: DETECTED
CREAT SERPL-MCNC: 0.73 MG/DL (ref 0.6–1.3)
DIFFERENTIAL METHOD BLD: ABNORMAL
EOSINOPHIL # BLD: 0 K/UL (ref 0–0.4)
EOSINOPHIL NFR BLD: 0 % (ref 0–5)
ERYTHROCYTE [DISTWIDTH] IN BLOOD BY AUTOMATED COUNT: 14.1 % (ref 11.6–14.5)
ETHANOL SERPL-MCNC: 475 MG/DL (ref 0–3)
GLOBULIN SER CALC-MCNC: 5 G/DL (ref 2–4)
GLUCOSE SERPL-MCNC: 107 MG/DL (ref 74–99)
HCT VFR BLD AUTO: 41.5 % (ref 36–48)
HGB BLD-MCNC: 14.3 G/DL (ref 13–16)
IMM GRANULOCYTES # BLD AUTO: 0 K/UL (ref 0–0.04)
IMM GRANULOCYTES NFR BLD AUTO: 0 % (ref 0–0.5)
LYMPHOCYTES # BLD: 1.2 K/UL (ref 0.9–3.6)
LYMPHOCYTES NFR BLD: 49 % (ref 21–52)
MAGNESIUM SERPL-MCNC: 1.9 MG/DL (ref 1.6–2.6)
MCH RBC QN AUTO: 31.7 PG (ref 24–34)
MCHC RBC AUTO-ENTMCNC: 34.5 G/DL (ref 31–37)
MCV RBC AUTO: 92 FL (ref 78–100)
MONOCYTES # BLD: 0.2 K/UL (ref 0.05–1.2)
MONOCYTES NFR BLD: 10 % (ref 3–10)
NEUTS SEG # BLD: 1 K/UL (ref 1.8–8)
NEUTS SEG NFR BLD: 41 % (ref 40–73)
NRBC # BLD: 0 K/UL (ref 0–0.01)
NRBC BLD-RTO: 0 PER 100 WBC
PLATELET # BLD AUTO: 128 K/UL (ref 135–420)
PMV BLD AUTO: 9.5 FL (ref 9.2–11.8)
POTASSIUM SERPL-SCNC: 4.6 MMOL/L (ref 3.5–5.5)
PROT SERPL-MCNC: 9.3 G/DL (ref 6.4–8.2)
RBC # BLD AUTO: 4.51 M/UL (ref 4.35–5.65)
SALICYLATES SERPL-MCNC: 3 MG/DL (ref 2.8–20)
SODIUM SERPL-SCNC: 141 MMOL/L (ref 136–145)
SOURCE, COVRS: ABNORMAL
WBC # BLD AUTO: 2.4 K/UL (ref 4.6–13.2)

## 2022-01-16 PROCEDURE — 73564 X-RAY EXAM KNEE 4 OR MORE: CPT

## 2022-01-16 PROCEDURE — 85025 COMPLETE CBC W/AUTO DIFF WBC: CPT

## 2022-01-16 PROCEDURE — 83735 ASSAY OF MAGNESIUM: CPT

## 2022-01-16 PROCEDURE — 82077 ASSAY SPEC XCP UR&BREATH IA: CPT

## 2022-01-16 PROCEDURE — 80179 DRUG ASSAY SALICYLATE: CPT

## 2022-01-16 PROCEDURE — 70450 CT HEAD/BRAIN W/O DYE: CPT

## 2022-01-16 PROCEDURE — 87635 SARS-COV-2 COVID-19 AMP PRB: CPT

## 2022-01-16 PROCEDURE — 80143 DRUG ASSAY ACETAMINOPHEN: CPT

## 2022-01-16 PROCEDURE — 80053 COMPREHEN METABOLIC PANEL: CPT

## 2022-01-16 PROCEDURE — 99284 EMERGENCY DEPT VISIT MOD MDM: CPT

## 2022-01-16 NOTE — ED TRIAGE NOTES
Pt arrives to ed reporting headache and multiple other complaints. Pt does report the need to stop drinking. pts last drink was prior to ems arriving.

## 2022-01-16 NOTE — ED PROVIDER NOTES
EMERGENCY DEPARTMENT HISTORY AND PHYSICAL EXAM    Date: 1/16/2022  Patient Name: Nila Long    History of Presenting Illness     Chief Complaint   Patient presents with    Delirium Tremens (DTS)         History Provided By: Patient and EMS    Additional History (Context): Nila Long is a 48 y.o. male with diabetes, hypertension, hyperlipidemia and ETOH abuse who presents with multiple issues today. EMS was called to the house initially because of patient feeling shaking saying he was diabetic and had concerns. Blood sugar was 140. Then he started complaining of right knee pain and he confides to me that he fell on it 2 weeks ago when he slid down some stairs. Complaining of a headache today. He is also trying to stop drinking alcohol. Mira Ronquillo he has been drinking at least 3 quarts of beer a day for years. His wife is upset with him and he is considering detox. Last drink was this morning. Patient works at [employer]. 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 Smokes marijuana. Is vaccinated for COVID. PCP: None    Current Outpatient Medications   Medication Sig Dispense Refill    methocarbamoL (Robaxin) 500 mg tablet Take 2 Tablets by mouth three (3) times daily. 30 Tablet 0    LOSARTAN PO Take  by mouth.  sertraline (Zoloft) 100 mg tablet Take  by mouth daily.  atorvastatin (LIPITOR) 10 mg tablet Take  by mouth daily.  metFORMIN (GLUCOPHAGE) 500 mg tablet Take  by mouth two (2) times daily (with meals).  pantoprazole (Protonix) 20 mg tablet Take 20 mg by mouth daily.  ondansetron hcl (Zofran) 4 mg tablet Take 1 Tablet by mouth every eight (8) hours as needed for Nausea. 12 Tablet 0    dicyclomine (BENTYL) 10 mg capsule Take 2 Capsules by mouth four (4) times daily as needed for Abdominal Cramps.  50 Capsule 0       Past History     Past Medical History:  Past Medical History:   Diagnosis Date    Diabetes ([address]     Gastric reflux     Hx of colonoscopy     Hypertension        Past Surgical History:  No past surgical history on file. Family History:  No family history on file. Social History:  Social History     Tobacco Use    Smoking status: Current Every Day Smoker    Smokeless tobacco: Never Used   Substance Use Topics    Alcohol use: Yes     Alcohol/week: 2.0 standard drinks     Types: 2 Cans of beer per week    Drug use: Yes     Types: Marijuana       Allergies: Allergies   Allergen Reactions    Garlic Other (comments)         Review of Systems   Review of Systems   Constitutional: Negative for fever. HENT: Negative. Eyes: Negative for visual disturbance. Respiratory: Negative for shortness of breath. Cardiovascular: Negative for chest pain. Gastrointestinal: Negative for nausea and vomiting. Endocrine: Negative. Genitourinary: Negative. Musculoskeletal: Positive for arthralgias and gait problem. Skin: Negative. Allergic/Immunologic: Negative. Neurological: Positive for headaches. Negative for dizziness, seizures, weakness and numbness. Psychiatric/Behavioral: Positive for dysphoric mood. Negative for self-injury and suicidal ideas. All Other Systems Negative  Physical Exam     Vitals:    01/16/22 1433   BP: (!) 156/101   Pulse: 85   Resp: 18   Temp: 98.2 °F (36.8 °C)   SpO2: 98%     Physical Exam  Vitals and nursing note reviewed. Constitutional:       General: He is not in acute distress. Appearance: He is well-developed. He is not ill-appearing, toxic-appearing or diaphoretic. HENT:      Head: Normocephalic and atraumatic. Neck:      Thyroid: No thyromegaly. Vascular: No carotid bruit. Trachea: No tracheal deviation. Cardiovascular:      Rate and Rhythm: Normal rate and regular rhythm. Heart sounds: Normal heart sounds. No murmur heard. No friction rub. No gallop. Pulmonary:      Effort: Pulmonary effort is normal. No respiratory distress.       Breath sounds: Normal breath sounds. No stridor. No wheezing or rales. Chest:      Chest wall: No tenderness. Abdominal:      General: There is no distension. Palpations: Abdomen is soft. There is no mass. Tenderness: There is no abdominal tenderness. There is no guarding or rebound. Musculoskeletal:         General: Tenderness and signs of injury present. Normal range of motion. Cervical back: Normal range of motion and neck supple. Comments: Right knee: Tenderness to the medial joint line as well as distal femoral epicondylar area medially. Nontender remaining knee joint. No effusion. DP PT pulses palpable nontender ankle. Skin:     General: Skin is warm and dry. Coloration: Skin is not pale. Neurological:      Mental Status: He is alert. Psychiatric:         Speech: Speech normal.         Behavior: Behavior normal.         Thought Content: Thought content normal.         Judgment: Judgment normal.          Diagnostic Study Results     Labs -     Recent Results (from the past 12 hour(s))   CBC WITH AUTOMATED DIFF    Collection Time: 01/16/22  2:30 PM   Result Value Ref Range    WBC 2.4 (L) 4.6 - 13.2 K/uL    RBC 4.51 4.35 - 5.65 M/uL    HGB 14.3 13.0 - 16.0 g/dL    HCT 41.5 36.0 - 48.0 %    MCV 92.0 78.0 - 100.0 FL    MCH 31.7 24.0 - 34.0 PG    MCHC 34.5 31.0 - 37.0 g/dL    RDW 14.1 11.6 - 14.5 %    PLATELET 074 (L) 888 - 420 K/uL    MPV 9.5 9.2 - 11.8 FL    NRBC 0.0 0  WBC    ABSOLUTE NRBC 0.00 0.00 - 0.01 K/uL    NEUTROPHILS 41 40 - 73 %    LYMPHOCYTES 49 21 - 52 %    MONOCYTES 10 3 - 10 %    EOSINOPHILS 0 0 - 5 %    BASOPHILS 0 0 - 2 %    IMMATURE GRANULOCYTES 0 0.0 - 0.5 %    ABS. NEUTROPHILS 1.0 (L) 1.8 - 8.0 K/UL    ABS. LYMPHOCYTES 1.2 0.9 - 3.6 K/UL    ABS. MONOCYTES 0.2 0.05 - 1.2 K/UL    ABS. EOSINOPHILS 0.0 0.0 - 0.4 K/UL    ABS. BASOPHILS 0.0 0.0 - 0.1 K/UL    ABS. IMM.  GRANS. 0.0 0.00 - 0.04 K/UL    DF AUTOMATED     METABOLIC PANEL, COMPREHENSIVE    Collection Time: 01/16/22 2:30 PM   Result Value Ref Range    Sodium 141 136 - 145 mmol/L    Potassium 4.6 3.5 - 5.5 mmol/L    Chloride 109 100 - 111 mmol/L    CO2 25 21 - 32 mmol/L    Anion gap 7 3.0 - 18 mmol/L    Glucose 107 (H) 74 - 99 mg/dL    BUN 6 (L) 7.0 - 18 MG/DL    Creatinine 0.73 0.6 - 1.3 MG/DL    BUN/Creatinine ratio 8 (L) 12 - 20      GFR est AA >60 >60 ml/min/1.73m2    GFR est non-AA >60 >60 ml/min/1.73m2    Calcium 8.9 8.5 - 10.1 MG/DL    Bilirubin, total 0.7 0.2 - 1.0 MG/DL    ALT (SGPT) 93 (H) 16 - 61 U/L    AST (SGOT) 225 (H) 10 - 38 U/L    Alk. phosphatase 157 (H) 45 - 117 U/L    Protein, total 9.3 (H) 6.4 - 8.2 g/dL    Albumin 4.3 3.4 - 5.0 g/dL    Globulin 5.0 (H) 2.0 - 4.0 g/dL    A-G Ratio 0.9 0.8 - 1.7     ETHYL ALCOHOL    Collection Time: 01/16/22  2:30 PM   Result Value Ref Range    ALCOHOL(ETHYL),SERUM 475 (HH) 0 - 3 MG/DL   MAGNESIUM    Collection Time: 01/16/22  2:30 PM   Result Value Ref Range    Magnesium 1.9 1.6 - 2.6 mg/dL   SALICYLATE    Collection Time: 01/16/22  2:30 PM   Result Value Ref Range    Salicylate level 3.0 2.8 - 20.0 MG/DL   ACETAMINOPHEN    Collection Time: 01/16/22  2:30 PM   Result Value Ref Range    Acetaminophen level <2 (L) 10.0 - 30.0 ug/mL   COVID-19 RAPID TEST    Collection Time: 01/16/22  2:30 PM   Result Value Ref Range    Specimen source Nasopharyngeal      COVID-19 rapid test Detected (AA) NOTD         Radiologic Studies -   CT HEAD WO CONT   Final Result      No CT evidence of an acute intracranial abnormality. XR KNEE RT MIN 4 V   Final Result      No acute osseous abnormality. Secondary findings suggestive of at least grade 1   MCL injury. CT Results  (Last 48 hours)               01/16/22 1526  CT HEAD WO CONT Final result    Impression:      No CT evidence of an acute intracranial abnormality.        Narrative:  EXAM: CT HEAD, WITHOUT IV CONTRAST       INDICATION: Persistent severe headache       COMPARISON: None       TECHNIQUE: Multiple axial CT images of the head were obtained extending from the   skull base through the vertex. Additional coronal and sagittal reformations were   also performed. One or more dose reduction techniques were used on this CT:   automated exposure control, adjustment of the mAs and/or kVp according to   patient size, and iterative reconstruction techniques. The specific techniques   used on this CT exam have been documented in the patient's electronic medical   record. Digital Imaging and Communications in Medicine (DICOM) format image   data are available to nonaffiliated external healthcare facilities or entities   on a secure, media free, reciprocally searchable basis with patient   authorization for at least a 12-month period after this study. _______________       FINDINGS:       BRAIN AND POSTERIOR FOSSA: The sulci, folia, ventricles and basal cisterns are   within normal limits for the patient's age. There is no intracranial hemorrhage,   mass effect, or midline shift. There are no areas of abnormal parenchymal   attenuation. EXTRA-AXIAL SPACES AND MENINGES: There are no abnormal extra-axial fluid   collections. CALVARIUM: No acute osseous abnormality. OTHER: The visualized portions of the paranasal sinuses and mastoid air cells   are clear.       _______________               CXR Results  (Last 48 hours)    None            Medical Decision Making   I am the first provider for this patient. I reviewed the vital signs, available nursing notes, past medical history, past surgical history, family history and social history. Vital Signs-Reviewed the patient's vital signs. Records Reviewed: Nursing Notes    Procedures:  Procedures    Provider Notes (Medical Decision Making): Patient is COVID-positive and intoxicated but head CT knee x-ray showed nothing acute. His labs are otherwise unremarkable.   Patient admits to large amounts of chronic alcohol consumption but is not clinically sober walking with a crisp easy gait. He is called his wife who is called a cab for him. He can follow-up with his PCP. Patient is not suicidal.    MED RECONCILIATION:  No current facility-administered medications for this encounter. Current Outpatient Medications   Medication Sig    methocarbamoL (Robaxin) 500 mg tablet Take 2 Tablets by mouth three (3) times daily.  LOSARTAN PO Take  by mouth.  sertraline (Zoloft) 100 mg tablet Take  by mouth daily.  atorvastatin (LIPITOR) 10 mg tablet Take  by mouth daily.  metFORMIN (GLUCOPHAGE) 500 mg tablet Take  by mouth two (2) times daily (with meals).  pantoprazole (Protonix) 20 mg tablet Take 20 mg by mouth daily.  ondansetron hcl (Zofran) 4 mg tablet Take 1 Tablet by mouth every eight (8) hours as needed for Nausea.  dicyclomine (BENTYL) 10 mg capsule Take 2 Capsules by mouth four (4) times daily as needed for Abdominal Cramps. Disposition:  home    DISCHARGE NOTE:   4:24 PM    Pt has been reexamined. Patient has no new complaints, changes, or physical findings. Care plan outlined and precautions discussed. Results of labs, x-rays, CT were reviewed with the patient. All medications were reviewed with the patient. All of pt's questions and concerns were addressed. Patient was instructed and agrees to follow up with PCP, as well as to return to the ED upon further deterioration. Patient is ready to go home.     Follow-up Information     Follow up With Specialties Details Why Contact Info    [address] [address] [place]  Schedule an appointment as soon as possible for a visit in 2 days  [address] [facility], [address] [facility] [address]    THE [facility] EMERGENCY DEPT Emergency Medicine  If symptoms worsen return immediately [address] [id number]  EzmoJulia tineo PA-C Physician Assistant Schedule an appointment as soon as possible for a visit in 1 day  [facility] [address]            Current Discharge Medication List              Diagnosis     Clinical Impression:   1. COVID    2. Alcoholic intoxication without complication ([facility] [address]    3.  Acute pain of right knee

## 2022-01-16 NOTE — ED NOTES
Pt found to be wandering ED hallway, pt directed back into room and asked to stay in room due to being covid positive, pt sts \"I want to go home, I need a cab\", EDUARDO Dos Santos made aware

## 2022-01-16 NOTE — Clinical Note
[facility] [place]  [facility]  [address]  [facility] [address] [phone]    Work/School Note    Date: 1/16/2022     To Whom It May concern:    Richard Oates was evaluated by the following provider(s):  Physician Assistant: Licha Hoskins virus is suspected. Per the CDC guidelines we recommend home isolation until the following conditions are all met:    1. At least five days have passed since symptoms first appeared and/or had a close exposure,   2. After home isolation for five days, wearing a mask around others for the next five days,  3. At least 24 have passed since last fever without the use of fever-reducing medications and  4.  Symptoms (eg cough, shortness of breath) have improved      Sincerely,          EDUARDO Rinaldi

## 2022-01-17 ENCOUNTER — PATIENT OUTREACH (OUTPATIENT)
Dept: CASE MANAGEMENT | Age: 47
End: 2022-01-17

## 2022-01-17 NOTE — PROGRESS NOTES
Ambulatory Care Coordination ED COVID Follow up Call    Challenges to be reviewed by the provider   Additional needs identified to be addressed with provider no  none           Encounter was not routed to provider for escalation. Method of communication with provider : none  Spoke with patient and patient's mother on speaker phone. Patient gave permission for his mother to hear PHI. Discussed COVID-19 related testing which was available at this time. Test results were positive. Patient informed of results, if available? yes. Current Symptoms: fatigue and watery eyes    Reviewed New or Changed Meds: n/a    Do you have what you need at home?  Durable Medical Equipment ordered at discharge: None   Home Health/Outpatient orders at discharge: none    Pulse oximeter? no Discussed and confirmed pulse oximeter discharge instructions and when to notify provider or seek emergency care. Patient education provided: Reviewed appropriate site of care based on symptoms and resources available to patient including: CDC recommended isolation/masking . Follow up appointment recommended: yes. If no appointment scheduled, scheduling offered: Patient advised to return to ED if sx worsen. No future appointments. Interventions: Obtained and reviewed discharge summary and/or continuity of care documents and Education of patient/family/caregiver/guardian to support self-management-. Reviewed discharge instructions, medical action plan and red flags with patient who verbalized understanding. Provided contact information for future needs. Plan for follow-up call in 7-10 days based on severity of symptoms and risk factors.   Plan for next call: symptom management-.     Maciej Mondragon

## 2022-05-11 ENCOUNTER — HOSPITAL ENCOUNTER (EMERGENCY)
Age: 47
Discharge: HOME | End: 2022-05-12
Attending: EMERGENCY MEDICINE
Payer: MEDICAID

## 2022-05-11 ENCOUNTER — APPOINTMENT (OUTPATIENT)
Dept: CT IMAGING | Age: 47
End: 2022-05-11
Attending: EMERGENCY MEDICINE
Payer: MEDICAID

## 2022-05-11 VITALS
WEIGHT: 140 LBS | DIASTOLIC BLOOD PRESSURE: 71 MMHG | HEIGHT: 63 IN | TEMPERATURE: 97.8 F | HEART RATE: 79 BPM | SYSTOLIC BLOOD PRESSURE: 113 MMHG | BODY MASS INDEX: 24.8 KG/M2 | OXYGEN SATURATION: 95 % | RESPIRATION RATE: 16 BRPM

## 2022-05-11 DIAGNOSIS — R51.9 ACUTE NONINTRACTABLE HEADACHE, UNSPECIFIED HEADACHE TYPE: Primary | ICD-10-CM

## 2022-05-11 LAB
ALBUMIN SERPL-MCNC: 4.3 G/DL (ref 3.4–5)
ALBUMIN/GLOB SERPL: 1 {RATIO} (ref 0.8–1.7)
ALP SERPL-CCNC: 85 U/L (ref 45–117)
ALT SERPL-CCNC: 19 U/L (ref 16–61)
ANION GAP SERPL CALC-SCNC: 5 MMOL/L (ref 3–18)
AST SERPL-CCNC: 24 U/L (ref 10–38)
BASOPHILS # BLD: 0 K/UL (ref 0–0.1)
BASOPHILS NFR BLD: 1 % (ref 0–2)
BILIRUB SERPL-MCNC: 0.6 MG/DL (ref 0.2–1)
BUN SERPL-MCNC: 9 MG/DL (ref 7–18)
BUN/CREAT SERPL: 12 (ref 12–20)
CALCIUM SERPL-MCNC: 9.1 MG/DL (ref 8.5–10.1)
CHLORIDE SERPL-SCNC: 109 MMOL/L (ref 100–111)
CO2 SERPL-SCNC: 30 MMOL/L (ref 21–32)
CREAT SERPL-MCNC: 0.73 MG/DL (ref 0.6–1.3)
DIFFERENTIAL METHOD BLD: ABNORMAL
EOSINOPHIL # BLD: 0 K/UL (ref 0–0.4)
EOSINOPHIL NFR BLD: 1 % (ref 0–5)
ERYTHROCYTE [DISTWIDTH] IN BLOOD BY AUTOMATED COUNT: 12.7 % (ref 11.6–14.5)
GLOBULIN SER CALC-MCNC: 4.2 G/DL (ref 2–4)
GLUCOSE BLD STRIP.AUTO-MCNC: 82 MG/DL (ref 70–110)
GLUCOSE SERPL-MCNC: 82 MG/DL (ref 74–99)
HCT VFR BLD AUTO: 39.6 % (ref 36–48)
HGB BLD-MCNC: 13.7 G/DL (ref 13–16)
IMM GRANULOCYTES # BLD AUTO: 0 K/UL (ref 0–0.04)
IMM GRANULOCYTES NFR BLD AUTO: 0 % (ref 0–0.5)
LYMPHOCYTES # BLD: 3.2 K/UL (ref 0.9–3.6)
LYMPHOCYTES NFR BLD: 59 % (ref 21–52)
MCH RBC QN AUTO: 32.4 PG (ref 24–34)
MCHC RBC AUTO-ENTMCNC: 34.6 G/DL (ref 31–37)
MCV RBC AUTO: 93.6 FL (ref 78–100)
MONOCYTES # BLD: 0.3 K/UL (ref 0.05–1.2)
MONOCYTES NFR BLD: 5 % (ref 3–10)
NEUTS SEG # BLD: 1.9 K/UL (ref 1.8–8)
NEUTS SEG NFR BLD: 35 % (ref 40–73)
NRBC # BLD: 0 K/UL (ref 0–0.01)
NRBC BLD-RTO: 0 PER 100 WBC
PLATELET # BLD AUTO: 255 K/UL (ref 135–420)
PMV BLD AUTO: 9.7 FL (ref 9.2–11.8)
POTASSIUM SERPL-SCNC: 3.8 MMOL/L (ref 3.5–5.5)
PROT SERPL-MCNC: 8.5 G/DL (ref 6.4–8.2)
RBC # BLD AUTO: 4.23 M/UL (ref 4.35–5.65)
SODIUM SERPL-SCNC: 144 MMOL/L (ref 136–145)
WBC # BLD AUTO: 5.5 K/UL (ref 4.6–13.2)

## 2022-05-11 PROCEDURE — 80053 COMPREHEN METABOLIC PANEL: CPT

## 2022-05-11 PROCEDURE — 82962 GLUCOSE BLOOD TEST: CPT

## 2022-05-11 PROCEDURE — 70450 CT HEAD/BRAIN W/O DYE: CPT

## 2022-05-11 PROCEDURE — 96374 THER/PROPH/DIAG INJ IV PUSH: CPT

## 2022-05-11 PROCEDURE — 85025 COMPLETE CBC W/AUTO DIFF WBC: CPT

## 2022-05-11 PROCEDURE — 99284 EMERGENCY DEPT VISIT MOD MDM: CPT

## 2022-05-11 PROCEDURE — 96375 TX/PRO/DX INJ NEW DRUG ADDON: CPT

## 2022-05-11 NOTE — Clinical Note
[facility] [place]  [facility]  [address] [facility] [address] [phone]    Work/School Note    Date: 5/11/2022    To Whom It May concern:    Deonna Whitfield was seen and treated today in the emergency room by the following provider(s):  Attending Provider: Bud Clemons MD  Physician Assistant: EDUARDO Wisdom. Deonna Whitfield is excused from work/school on 05/12/22 and 05/13/22. He is medically clear to return to work/school on 5/14/2022.        Sincerely,          EDUARDO Sue

## 2022-05-11 NOTE — Clinical Note
[facility] [place]  [facility]  [address]  [facility] [address] [phone]    Work/School Note    Date: 5/11/2022    To Whom It May concern:    Lewis Becerra was seen and treated today in the emergency room by the following provider(s):  Attending Provider: Carlos Glez MD  Physician Assistant: EDUARDO Zambrano. Lewis Becerra is excused from work/school on 05/12/22 and 05/13/22. He is medically clear to return to work/school on 5/14/2022.        Sincerely,          EDUARDO Camargo

## 2022-05-12 ENCOUNTER — HOSPITAL ENCOUNTER (EMERGENCY)
Age: 47
Discharge: HOME | End: 2022-05-12
Attending: STUDENT IN AN ORGANIZED HEALTH CARE EDUCATION/TRAINING PROGRAM
Payer: COMMERCIAL

## 2022-05-12 VITALS
WEIGHT: 140 LBS | HEART RATE: 97 BPM | OXYGEN SATURATION: 97 % | BODY MASS INDEX: 24.8 KG/M2 | TEMPERATURE: 97.7 F | SYSTOLIC BLOOD PRESSURE: 153 MMHG | RESPIRATION RATE: 18 BRPM | DIASTOLIC BLOOD PRESSURE: 108 MMHG

## 2022-05-12 DIAGNOSIS — F10.10 ALCOHOL ABUSE: Primary | ICD-10-CM

## 2022-05-12 LAB
ALBUMIN SERPL-MCNC: 3.9 G/DL (ref 3.4–5)
ALBUMIN/GLOB SERPL: 1.1 {RATIO} (ref 0.8–1.7)
ALP SERPL-CCNC: 88 U/L (ref 45–117)
ALT SERPL-CCNC: 18 U/L (ref 16–61)
AMPHET UR QL SCN: NEGATIVE
ANION GAP SERPL CALC-SCNC: 8 MMOL/L (ref 3–18)
APPEARANCE UR: CLEAR
AST SERPL-CCNC: 28 U/L (ref 10–38)
BARBITURATES UR QL SCN: NEGATIVE
BASOPHILS # BLD: 0 K/UL (ref 0–0.1)
BASOPHILS NFR BLD: 1 % (ref 0–2)
BENZODIAZ UR QL: NEGATIVE
BILIRUB SERPL-MCNC: 0.6 MG/DL (ref 0.2–1)
BILIRUB UR QL: NEGATIVE
BUN SERPL-MCNC: 11 MG/DL (ref 7–18)
BUN/CREAT SERPL: 13 (ref 12–20)
CALCIUM SERPL-MCNC: 8.7 MG/DL (ref 8.5–10.1)
CANNABINOIDS UR QL SCN: NEGATIVE
CHLORIDE SERPL-SCNC: 109 MMOL/L (ref 100–111)
CO2 SERPL-SCNC: 27 MMOL/L (ref 21–32)
COCAINE UR QL SCN: NEGATIVE
COLOR UR: YELLOW
COVID-19 RAPID TEST, COVR: NOT DETECTED
CREAT SERPL-MCNC: 0.84 MG/DL (ref 0.6–1.3)
DIFFERENTIAL METHOD BLD: ABNORMAL
EOSINOPHIL # BLD: 0.1 K/UL (ref 0–0.4)
EOSINOPHIL NFR BLD: 1 % (ref 0–5)
ERYTHROCYTE [DISTWIDTH] IN BLOOD BY AUTOMATED COUNT: 12.7 % (ref 11.6–14.5)
ETHANOL SERPL-MCNC: 493 MG/DL (ref 0–3)
GLOBULIN SER CALC-MCNC: 3.7 G/DL (ref 2–4)
GLUCOSE SERPL-MCNC: 117 MG/DL (ref 74–99)
GLUCOSE UR STRIP.AUTO-MCNC: NEGATIVE MG/DL
HCT VFR BLD AUTO: 39.7 % (ref 36–48)
HDSCOM,HDSCOM: NORMAL
HGB BLD-MCNC: 13.7 G/DL (ref 13–16)
HGB UR QL STRIP: NEGATIVE
IMM GRANULOCYTES # BLD AUTO: 0 K/UL (ref 0–0.04)
IMM GRANULOCYTES NFR BLD AUTO: 0 % (ref 0–0.5)
KETONES UR QL STRIP.AUTO: NEGATIVE MG/DL
LEUKOCYTE ESTERASE UR QL STRIP.AUTO: NEGATIVE
LYMPHOCYTES # BLD: 3.2 K/UL (ref 0.9–3.6)
LYMPHOCYTES NFR BLD: 57 % (ref 21–52)
MAGNESIUM SERPL-MCNC: 2.1 MG/DL (ref 1.6–2.6)
MCH RBC QN AUTO: 32.7 PG (ref 24–34)
MCHC RBC AUTO-ENTMCNC: 34.5 G/DL (ref 31–37)
MCV RBC AUTO: 94.7 FL (ref 78–100)
METHADONE UR QL: NEGATIVE
MONOCYTES # BLD: 0.4 K/UL (ref 0.05–1.2)
MONOCYTES NFR BLD: 7 % (ref 3–10)
NEUTS SEG # BLD: 1.9 K/UL (ref 1.8–8)
NEUTS SEG NFR BLD: 35 % (ref 40–73)
NITRITE UR QL STRIP.AUTO: NEGATIVE
NRBC # BLD: 0 K/UL (ref 0–0.01)
NRBC BLD-RTO: 0 PER 100 WBC
OPIATES UR QL: NEGATIVE
PCP UR QL: NEGATIVE
PH UR STRIP: 6 [PH] (ref 5–8)
PLATELET # BLD AUTO: 243 K/UL (ref 135–420)
PMV BLD AUTO: 9.8 FL (ref 9.2–11.8)
POTASSIUM SERPL-SCNC: 3.6 MMOL/L (ref 3.5–5.5)
PROT SERPL-MCNC: 7.6 G/DL (ref 6.4–8.2)
PROT UR STRIP-MCNC: NEGATIVE MG/DL
RBC # BLD AUTO: 4.19 M/UL (ref 4.35–5.65)
SODIUM SERPL-SCNC: 144 MMOL/L (ref 136–145)
SOURCE, COVRS: NORMAL
SP GR UR REFRACTOMETRY: <1.005 (ref 1–1.03)
UROBILINOGEN UR QL STRIP.AUTO: 0.2 EU/DL (ref 0.2–1)
WBC # BLD AUTO: 5.6 K/UL (ref 4.6–13.2)

## 2022-05-12 PROCEDURE — 87635 SARS-COV-2 COVID-19 AMP PRB: CPT

## 2022-05-12 PROCEDURE — 82077 ASSAY SPEC XCP UR&BREATH IA: CPT

## 2022-05-12 PROCEDURE — 83735 ASSAY OF MAGNESIUM: CPT

## 2022-05-12 PROCEDURE — 80307 DRUG TEST PRSMV CHEM ANLYZR: CPT

## 2022-05-12 PROCEDURE — 85025 COMPLETE CBC W/AUTO DIFF WBC: CPT

## 2022-05-12 PROCEDURE — 81003 URINALYSIS AUTO W/O SCOPE: CPT

## 2022-05-12 PROCEDURE — 80053 COMPREHEN METABOLIC PANEL: CPT

## 2022-05-12 PROCEDURE — 99283 EMERGENCY DEPT VISIT LOW MDM: CPT

## 2022-05-12 PROCEDURE — 74011250636 HC RX REV CODE- 250/636: Performed by: EMERGENCY MEDICINE

## 2022-05-12 RX ORDER — ONDANSETRON 2 MG/ML
4 INJECTION INTRAMUSCULAR; INTRAVENOUS
Status: COMPLETED | OUTPATIENT
Start: 2022-05-12 | End: 2022-05-12

## 2022-05-12 RX ORDER — DIPHENHYDRAMINE HYDROCHLORIDE 50 MG/ML
12.5 INJECTION, SOLUTION INTRAMUSCULAR; INTRAVENOUS
Status: COMPLETED | OUTPATIENT
Start: 2022-05-12 | End: 2022-05-12

## 2022-05-12 RX ORDER — KETOROLAC TROMETHAMINE 15 MG/ML
15 INJECTION, SOLUTION INTRAMUSCULAR; INTRAVENOUS
Status: COMPLETED | OUTPATIENT
Start: 2022-05-12 | End: 2022-05-12

## 2022-05-12 RX ADMIN — KETOROLAC TROMETHAMINE 15 MG: 15 INJECTION, SOLUTION INTRAMUSCULAR; INTRAVENOUS at 00:33

## 2022-05-12 RX ADMIN — SODIUM CHLORIDE 500 ML: 9 INJECTION, SOLUTION INTRAVENOUS at 00:42

## 2022-05-12 RX ADMIN — ONDANSETRON 4 MG: 2 INJECTION INTRAMUSCULAR; INTRAVENOUS at 00:42

## 2022-05-12 RX ADMIN — DIPHENHYDRAMINE HYDROCHLORIDE 12.5 MG: 50 INJECTION, SOLUTION INTRAMUSCULAR; INTRAVENOUS at 00:42

## 2022-05-12 NOTE — ED PROVIDER NOTES
EMERGENCY DEPARTMENT HISTORY AND PHYSICAL EXAM    Date: 5/11/2022  Patient Name: Sophy Finnegan    History of Presenting Illness     Chief Complaint   Patient presents with    Headache         History Provided By: Patient    Additional History (Context): Sophy Finnegan is a 50 y.o. male with diabetes and hypertension who presents with c/o HA x 3d. Gradual in onset. Denies trauma, FAM h/o AVM, cerebral aneurysm, vision changes. Has had bilateral watery eye discharge. Denies fever, n/v.  Drinks 3 beers daily. Had to leave work early b/c of symptoms. No meds taken for HA. PCP: None    Current Facility-Administered Medications   Medication Dose Route Frequency Provider Last Rate Last Admin    ketorolac (TORADOL) injection 15 mg  15 mg IntraVENous NOW Liz Dos Santos PA        diphenhydrAMINE (BENADRYL) injection 12.5 mg  12.5 mg IntraVENous NOW Liz Dos Santos PA        ondansetron (ZOFRAN) injection 4 mg  4 mg IntraVENous NOW Liz Dos Santos PA        sodium chloride 0.9 % bolus infusion 500 mL  500 mL IntraVENous ONCE Liz Dos Santos PA         Current Outpatient Medications   Medication Sig Dispense Refill    methocarbamoL (Robaxin) 500 mg tablet Take 2 Tablets by mouth three (3) times daily. 30 Tablet 0    LOSARTAN PO Take  by mouth.  sertraline (Zoloft) 100 mg tablet Take  by mouth daily.  atorvastatin (LIPITOR) 10 mg tablet Take  by mouth daily.  metFORMIN (GLUCOPHAGE) 500 mg tablet Take  by mouth two (2) times daily (with meals).  pantoprazole (Protonix) 20 mg tablet Take 20 mg by mouth daily.  ondansetron hcl (Zofran) 4 mg tablet Take 1 Tablet by mouth every eight (8) hours as needed for Nausea. 12 Tablet 0    dicyclomine (BENTYL) 10 mg capsule Take 2 Capsules by mouth four (4) times daily as needed for Abdominal Cramps.  50 Capsule 0       Past History     Past Medical History:  Past Medical History:   Diagnosis Date    Diabetes ([address]     Gastric reflux     Hx of colonoscopy  Hypertension        Past Surgical History:  No past surgical history on file. Family History:  No family history on file. Social History:  Social History     Tobacco Use    Smoking status: Current Every Day Smoker    Smokeless tobacco: Never Used   Substance Use Topics    Alcohol use: Yes     Alcohol/week: 2.0 standard drinks     Types: 2 Cans of beer per week    Drug use: Yes     Types: Marijuana       Allergies: Allergies   Allergen Reactions    Garlic Other (comments)         Review of Systems   Review of Systems   Constitutional: Negative for fever. HENT: Negative. Eyes: Positive for discharge. Negative for pain, redness and visual disturbance. Respiratory: Negative for shortness of breath. Cardiovascular: Negative for chest pain. Gastrointestinal: Negative for nausea and vomiting. Endocrine: Negative. Genitourinary: Negative. Musculoskeletal: Negative for gait problem and neck pain. Skin: Negative. Allergic/Immunologic: Negative. Neurological: Positive for headaches. Negative for dizziness, weakness and numbness. Hematological: Negative. Psychiatric/Behavioral: Negative. All Other Systems Negative  Physical Exam     Vitals:    05/11/22 2252 05/11/22 2307 05/11/22 2322 05/11/22 2337   BP: 115/75 115/73 115/81 113/71   Pulse:       Resp:       Temp:       SpO2: 92% 92% 95% 95%   Weight:       Height:         Physical Exam  Vitals and nursing note reviewed. Constitutional:       General: He is not in acute distress. Appearance: He is well-developed. He is not ill-appearing, toxic-appearing or diaphoretic. HENT:      Head: Normocephalic and atraumatic. Eyes:      Extraocular Movements: Extraocular movements intact. Neck:      Thyroid: No thyromegaly. Vascular: No carotid bruit. Trachea: No tracheal deviation. Cardiovascular:      Rate and Rhythm: Normal rate and regular rhythm. Heart sounds: Normal heart sounds. No murmur heard.   No friction rub. No gallop. Pulmonary:      Effort: Pulmonary effort is normal. No respiratory distress. Breath sounds: Normal breath sounds. No stridor. No wheezing or rales. Chest:      Chest wall: No tenderness. Abdominal:      General: There is no distension. Palpations: Abdomen is soft. There is no mass. Tenderness: There is no abdominal tenderness. There is no guarding or rebound. Musculoskeletal:         General: Normal range of motion. Cervical back: Normal range of motion and neck supple. Skin:     General: Skin is warm and dry. Coloration: Skin is not pale. Neurological:      Mental Status: He is alert and oriented to person, place, and time. Gait: Gait normal.   Psychiatric:         Speech: Speech normal.         Behavior: Behavior normal.         Thought Content: Thought content normal.         Judgment: Judgment normal.                Diagnostic Study Results     Labs -     Recent Results (from the past 12 hour(s))   CBC WITH AUTOMATED DIFF    Collection Time: 05/11/22 10:05 PM   Result Value Ref Range    WBC 5.5 4.6 - 13.2 K/uL    RBC 4.23 (L) 4.35 - 5.65 M/uL    HGB 13.7 13.0 - 16.0 g/dL    HCT 39.6 36.0 - 48.0 %    MCV 93.6 78.0 - 100.0 FL    MCH 32.4 24.0 - 34.0 PG    MCHC 34.6 31.0 - 37.0 g/dL    RDW 12.7 11.6 - 14.5 %    PLATELET 919 054 - 551 K/uL    MPV 9.7 9.2 - 11.8 FL    NRBC 0.0 0  WBC    ABSOLUTE NRBC 0.00 0.00 - 0.01 K/uL    NEUTROPHILS 35 (L) 40 - 73 %    LYMPHOCYTES 59 (H) 21 - 52 %    MONOCYTES 5 3 - 10 %    EOSINOPHILS 1 0 - 5 %    BASOPHILS 1 0 - 2 %    IMMATURE GRANULOCYTES 0 0.0 - 0.5 %    ABS. NEUTROPHILS 1.9 1.8 - 8.0 K/UL    ABS. LYMPHOCYTES 3.2 0.9 - 3.6 K/UL    ABS. MONOCYTES 0.3 0.05 - 1.2 K/UL    ABS. EOSINOPHILS 0.0 0.0 - 0.4 K/UL    ABS. BASOPHILS 0.0 0.0 - 0.1 K/UL    ABS. IMM.  GRANS. 0.0 0.00 - 0.04 K/UL    DF AUTOMATED     METABOLIC PANEL, COMPREHENSIVE    Collection Time: 05/11/22 10:05 PM   Result Value Ref Range    Sodium 144 136 - 145 mmol/L    Potassium 3.8 3.5 - 5.5 mmol/L    Chloride 109 100 - 111 mmol/L    CO2 30 21 - 32 mmol/L    Anion gap 5 3.0 - 18 mmol/L    Glucose 82 74 - 99 mg/dL    BUN 9 7.0 - 18 MG/DL    Creatinine 0.73 0.6 - 1.3 MG/DL    BUN/Creatinine ratio 12 12 - 20      GFR est AA >60 >60 ml/min/1.73m2    GFR est non-AA >60 >60 ml/min/1.73m2    Calcium 9.1 8.5 - 10.1 MG/DL    Bilirubin, total 0.6 0.2 - 1.0 MG/DL    ALT (SGPT) 19 16 - 61 U/L    AST (SGOT) 24 10 - 38 U/L    Alk. phosphatase 85 45 - 117 U/L    Protein, total 8.5 (H) 6.4 - 8.2 g/dL    Albumin 4.3 3.4 - 5.0 g/dL    Globulin 4.2 (H) 2.0 - 4.0 g/dL    A-G Ratio 1.0 0.8 - 1.7     GLUCOSE, POC    Collection Time: 05/11/22 10:06 PM   Result Value Ref Range    Glucose (POC) 82 70 - 110 mg/dL       Radiologic Studies -   CT HEAD WO CONT   Final Result      No acute intracranial abnormalities. CT Results  (Last 48 hours)               05/12/22 0008  CT HEAD WO CONT Final result    Impression:      No acute intracranial abnormalities. Narrative:  EXAM: CT head       INDICATION: Headache. COMPARISON: January 16, 2022. TECHNIQUE: Axial CT imaging of the head was performed without intravenous   contrast. Dose reduction techniques used: automated exposure control, adjustment   of the mAs and/or kVp according to patient size, and iterative reconstruction   techniques. Digital imaging and communications in Medicine (DICOM) format image   data are available to nonaffiliated external healthcare facilities or entities   on a secure, media free, reciprocally searchable basis with patient   authorization for at least 12 months after this study. _______________       FINDINGS:       BRAIN AND POSTERIOR FOSSA: The sulci, folia, ventricles and basal cisterns are   within normal limits for the patient's age. There is no intracranial hemorrhage,   mass effect, or midline shift. There are no areas of abnormal parenchymal   attenuation. EXTRA-AXIAL SPACES AND MENINGES: There are no abnormal extra-axial fluid   collections. CALVARIUM: Intact. SINUSES: Clear. OTHER: None.       _______________               CXR Results  (Last 48 hours)    None            Medical Decision Making   I am the first provider for this patient. I reviewed the vital signs, available nursing notes, past medical history, past surgical history, family history and social history. Vital Signs-Reviewed the patient's vital signs. Records Reviewed: Nursing Notes    Procedures:  Procedures    Provider Notes (Medical Decision Making):   Gradual onset of HA 2d ago; no n/v.  Head CT shows nothing acute. Treat w/benadryl, zofran, IVF, toradol. F/up with PCP. No mass, SAH. MED RECONCILIATION:  Current Facility-Administered Medications   Medication Dose Route Frequency    ketorolac (TORADOL) injection 15 mg  15 mg IntraVENous NOW    diphenhydrAMINE (BENADRYL) injection 12.5 mg  12.5 mg IntraVENous NOW    ondansetron (ZOFRAN) injection 4 mg  4 mg IntraVENous NOW    sodium chloride 0.9 % bolus infusion 500 mL  500 mL IntraVENous ONCE     Current Outpatient Medications   Medication Sig    methocarbamoL (Robaxin) 500 mg tablet Take 2 Tablets by mouth three (3) times daily.  LOSARTAN PO Take  by mouth.  sertraline (Zoloft) 100 mg tablet Take  by mouth daily.  atorvastatin (LIPITOR) 10 mg tablet Take  by mouth daily.  metFORMIN (GLUCOPHAGE) 500 mg tablet Take  by mouth two (2) times daily (with meals).  pantoprazole (Protonix) 20 mg tablet Take 20 mg by mouth daily.  ondansetron hcl (Zofran) 4 mg tablet Take 1 Tablet by mouth every eight (8) hours as needed for Nausea.  dicyclomine (BENTYL) 10 mg capsule Take 2 Capsules by mouth four (4) times daily as needed for Abdominal Cramps. Disposition:  home    DISCHARGE NOTE:   12:34 AM    Pt has been reexamined. Patient has no new complaints, changes, or physical findings.   Care plan outlined and precautions discussed. Results of labs, CT were reviewed with the patient. All medications were reviewed with the patient. All of pt's questions and concerns were addressed. Patient was instructed and agrees to follow up with PCP, as well as to return to the ED upon further deterioration. Patient is ready to go home. Follow-up Information     Follow up With Specialties Details Why Contact Info    [address] [place]  Schedule an appointment as soon as possible for a visit in 1 day  [address] [address] [facility], [address] [facility] [address]    [facility] EMERGENCY DEPT Emergency Medicine  If symptoms worsen return immediately [address] [facility]  [phone]          Current Discharge Medication List          Diagnosis     Clinical Impression:   1.  Acute nonintractable headache, unspecified headache type

## 2022-05-12 NOTE — ED NOTES
Requested refreshment provided. Sandwiches, cookies, chips, ice cream, coca angel. Pt displeased because the meal is not hot dish.

## 2022-05-12 NOTE — ED TRIAGE NOTES
Patient arrived via EMS from home. States that he wants to be admitted for alcohol abuse. Patient c/o nausea and vomiting. States that he drinks every day, 3-4 beer everyday for \"a couple of years\". States that the last alcoholic beverage is 2 hours ago 2 of the 42oz beers. Patient tearful during triage, NAD.

## 2022-05-13 NOTE — ED NOTES
Pts mother arrived to pick Pt up. Pt provided paperwork and was Dc from facility in no acute distress.

## 2022-05-26 NOTE — ED PROVIDER NOTES
EMERGENCY DEPARTMENT HISTORY AND PHYSICAL EXAM    Date: 5/12/2022  Patient Name: Sarah Ca    History of Presenting Illness     Chief Complaint   Patient presents with    Alcohol Problem       History Provided By: Patient     History Ruma Hobbs):     Sarah Ca is a 50 y.o. male with PMHX of alcohol dependence presents to the ER requesting detox from alcohol. Patient admits to drinking prior to arrival today, states that he does have a problem with alcohol and would like help to stop drinking. Patient denies any medical complaints at this time. Denies recent falls. No other medical complaints. Chief Complaint: Alcohol dependence  Onset: Chronic  Timing: Last drink was today  Context: Currently intoxicated on, symptoms have   not changed since onset  Location: n/a  Quality: n/a  Severity: N/A  Modifying Factors: n/a  Associated Symptoms: denies any other associated signs or symptoms    PCP: None     Current Outpatient Medications   Medication Sig Dispense Refill    methocarbamoL (Robaxin) 500 mg tablet Take 2 Tablets by mouth three (3) times daily. 30 Tablet 0    LOSARTAN PO Take  by mouth.  sertraline (Zoloft) 100 mg tablet Take  by mouth daily.  atorvastatin (LIPITOR) 10 mg tablet Take  by mouth daily.  metFORMIN (GLUCOPHAGE) 500 mg tablet Take  by mouth two (2) times daily (with meals).  pantoprazole (Protonix) 20 mg tablet Take 20 mg by mouth daily.  ondansetron hcl (Zofran) 4 mg tablet Take 1 Tablet by mouth every eight (8) hours as needed for Nausea. 12 Tablet 0    dicyclomine (BENTYL) 10 mg capsule Take 2 Capsules by mouth four (4) times daily as needed for Abdominal Cramps. 50 Capsule 0         Review of Systems      REVIEW OF SYSTEMS:    CONST: Negative for fever, body aches and chills. HENT: Negative for neck pain/stiffness, headache, congestion, sore throat, swelling. EYES: Negative for discharge/pain or vision changes.     RESP: Negative for cough/hemoptysis and shortness of breath. CV: Negative chest pain, difficulty breathing, palpitations. ABD: Negative pain, nausea, vomiting. : Negative increase frequency, dysuria, blood in urine or stool. MUSC: Negative for muscle weakness or edema. SKIN: Negative rash, lesions/sores. NEURO: Negative headache, dizziness, focal neurological deficits. Past History     I have reviewed all PMHX, FMHX and Social Hx as entered into the medical record in the chart below using the Epic Template. Past Medical History:  Past Medical History:   Diagnosis Date    Diabetes ([facility] [address]     Gastric reflux     Hx of colonoscopy     Hypertension        Past Surgical History:  No past surgical history on file. Family History:  History reviewed. No pertinent family history. Reviewed and non-contributory    Social History:  Social History     Tobacco Use    Smoking status: Current Every Day Smoker    Smokeless tobacco: Never Used   Substance Use Topics    Alcohol use: Yes     Alcohol/week: 2.0 standard drinks     Types: 2 Cans of beer per week    Drug use: Yes     Types: Marijuana       Allergies: Allergies   Allergen Reactions    Garlic Other (comments)         Physical Exam     Vitals:    05/12/22 1755   BP: (!) 153/108   Pulse: 97   Resp: 18   Temp: 97.7 °F (36.5 °C)   SpO2: 97%   Weight: 63.5 kg (140 lb)       Physical Exam  Vitals and nursing note reviewed. Constitutional:       General: Pt is not in acute distress. Appearance: Pt is well-developed, not diaphoretic. HENT:      Head: Normocephalic and atraumatic. Ear: External ear normal b/l     Nose: Nose normal.   Eyes:      General: No scleral icterus, EOMI     Pupil: VARSHA     Conjunctiva/sclera: Conjunctivae normal.   Cardiovascular:      Rate and Rhythm: Normal rate and regular rhythm. Heart sounds: Normal heart sounds   Pulmonary:      Effort: Pulmonary effort is normal. No tachypnea, accessory muscle usage or respiratory distress. Breath sounds: Normal breath sounds. No decreased breath sounds, wheezing, rhonchi or rales. Abdominal:      Palpations: Abdomen is soft. Tenderness: There is no abdominal tenderness. There is no right CVA tenderness, guarding or rebound. Negative signs include Balderrama's and McBurney's sign. Musculoskeletal:         General: Normal range of motion. Cervical back: Normal range of motion. Skin:     General: Skin is warm and dry. Neurological:      Mental Status: Pt is alert and oriented to person, place, and time. Psychiatric:         Behavior: Behavior normal.         Judgment: Judgment normal.       Diagnostic Study Results     Labs -   No results found for this or any previous visit (from the past 12 hour(s)). Radiologic Studies -  No orders to display     CT Results  (Last 48 hours)    None        CXR Results  (Last 48 hours)    None          Medications given in the ED-  Medications - No data to display      Medical Decision Making   I am the first provider for this patient. I reviewed the vital signs, available nursing notes, past medical history, past surgical history, family history and social history. Vital Signs-Reviewed the patient's vital signs. Pulse Oximetry Analysis - 97% on RA     Cardiac Monitor:  Rate: 95 bpm  Rhythm: sinus rhythm    ED Course:   1824  Initial assessment performed. The patients presenting problems have been discussed, and they are in agreement with the care plan formulated and outlined with them. I have encouraged them to ask questions as they arise throughout their visit. ED Course as of 05/25/22 2151   Thu May 12, 2022   1824 Patient was seen and examined at bedside in no acute distress, requesting alcohol detox program.  Patient is currently intoxicated. Will do labs for medical clearance for detox.  [NN]   1926 Patient was signed out to the incoming provider pending sobriety and detox placement. [NN]      ED Course User Index  [NN] Tavo Babb, Hines Oppenheim,             Diagnosis and Disposition         Dragon Disclaimer     Please note that this dictation was completed with evly, the computer voice recognition software. Quite often unanticipated grammatical, syntax, homophones, and other interpretive errors are inadvertently transcribed by the computer software. Please disregard these errors. Please excuse any errors that have escaped final proofreading.     Ji Dupont, DO

## 2022-09-04 ENCOUNTER — HOSPITAL ENCOUNTER (EMERGENCY)
Age: 47
Discharge: HOME | End: 2022-09-04
Attending: STUDENT IN AN ORGANIZED HEALTH CARE EDUCATION/TRAINING PROGRAM
Payer: MEDICAID

## 2022-09-04 ENCOUNTER — HOSPITAL ENCOUNTER (EMERGENCY)
Age: 47
Discharge: HOME | End: 2022-09-05
Attending: STUDENT IN AN ORGANIZED HEALTH CARE EDUCATION/TRAINING PROGRAM
Payer: MEDICAID

## 2022-09-04 ENCOUNTER — HOSPITAL ENCOUNTER (EMERGENCY)
Dept: GENERAL RADIOLOGY | Age: 47
Discharge: HOME | End: 2022-09-04
Attending: PHYSICIAN ASSISTANT
Payer: MEDICAID

## 2022-09-04 ENCOUNTER — APPOINTMENT (OUTPATIENT)
Dept: GENERAL RADIOLOGY | Age: 47
End: 2022-09-04
Attending: STUDENT IN AN ORGANIZED HEALTH CARE EDUCATION/TRAINING PROGRAM
Payer: MEDICAID

## 2022-09-04 VITALS
SYSTOLIC BLOOD PRESSURE: 125 MMHG | WEIGHT: 140 LBS | RESPIRATION RATE: 20 BRPM | DIASTOLIC BLOOD PRESSURE: 85 MMHG | HEIGHT: 64 IN | BODY MASS INDEX: 23.9 KG/M2 | OXYGEN SATURATION: 99 % | HEART RATE: 110 BPM

## 2022-09-04 VITALS
WEIGHT: 140 LBS | SYSTOLIC BLOOD PRESSURE: 113 MMHG | DIASTOLIC BLOOD PRESSURE: 79 MMHG | HEIGHT: 63 IN | HEART RATE: 119 BPM | OXYGEN SATURATION: 94 % | BODY MASS INDEX: 24.8 KG/M2 | TEMPERATURE: 98.2 F | RESPIRATION RATE: 20 BRPM

## 2022-09-04 DIAGNOSIS — S82.891D CLOSED FRACTURE OF RIGHT ANKLE WITH ROUTINE HEALING, SUBSEQUENT ENCOUNTER: Primary | ICD-10-CM

## 2022-09-04 DIAGNOSIS — Z91.14 NON COMPLIANCE W MEDICATION REGIMEN: ICD-10-CM

## 2022-09-04 DIAGNOSIS — S82.831A OTHER CLOSED FRACTURE OF DISTAL END OF RIGHT FIBULA, INITIAL ENCOUNTER: Primary | ICD-10-CM

## 2022-09-04 PROCEDURE — 74011250637 HC RX REV CODE- 250/637: Performed by: PHYSICIAN ASSISTANT

## 2022-09-04 PROCEDURE — 74011250637 HC RX REV CODE- 250/637: Performed by: STUDENT IN AN ORGANIZED HEALTH CARE EDUCATION/TRAINING PROGRAM

## 2022-09-04 PROCEDURE — 99283 EMERGENCY DEPT VISIT LOW MDM: CPT

## 2022-09-04 PROCEDURE — 73610 X-RAY EXAM OF ANKLE: CPT

## 2022-09-04 PROCEDURE — 73590 X-RAY EXAM OF LOWER LEG: CPT

## 2022-09-04 RX ORDER — OXYCODONE AND ACETAMINOPHEN 5; 325 MG/1; MG/1
1 TABLET ORAL
Qty: 6 TABLET | Refills: 0 | Status: SHIPPED | OUTPATIENT
Start: 2022-09-04 | End: 2022-09-07

## 2022-09-04 RX ORDER — OXYCODONE AND ACETAMINOPHEN 5; 325 MG/1; MG/1
1 TABLET ORAL ONCE
Status: COMPLETED | OUTPATIENT
Start: 2022-09-04 | End: 2022-09-04

## 2022-09-04 RX ORDER — HYDROCODONE BITARTRATE AND ACETAMINOPHEN 5; 325 MG/1; MG/1
1 TABLET ORAL
Status: COMPLETED | OUTPATIENT
Start: 2022-09-04 | End: 2022-09-04

## 2022-09-04 RX ADMIN — HYDROCODONE BITARTRATE AND ACETAMINOPHEN 1 TABLET: 5; 325 TABLET ORAL at 23:25

## 2022-09-04 RX ADMIN — OXYCODONE AND ACETAMINOPHEN 1 TABLET: 5; 325 TABLET ORAL at 03:46

## 2022-09-04 NOTE — ED TRIAGE NOTES
Patient arrives via EMS with c/c of right ankle pain r/t walking down stairs and twisting his ankle a few days ago. Patient endorses ETOH use today.

## 2022-09-04 NOTE — Clinical Note
[facility] [place]  [facility]  [address]  [facility] [id number] [phone]    Work/School Note    Date: 9/4/2022    To Whom It May concern:    Toro Brandt was seen and treated today in the emergency room by the following provider(s):  Attending Provider: Dejuan Pro MD.      Toro Brandt is excused from work/school on 9/4/2022 through 9/6/2022. He is medically clear to return to work/school on 9/7/2022.          Sincerely,          Asha Clay MD

## 2022-09-04 NOTE — ED PROVIDER NOTES
EMERGENCY DEPARTMENT HISTORY AND PHYSICAL EXAM    3:28 AM    Date: 9/4/2022  Patient Name: Nicolasa Prajapati    History of Presenting Illness     Chief Complaint   Patient presents with    Ankle Pain       History Provided By: Patient  Location/Duration/Severity/Modifying factors   HPI  Nicolasa Prajapati is a 50 y.o. male with past medical history of mental health disorder, alcohol abuse presenting for right ankle pain. 2 to 3 days ago he was walking down the stairs at his apartment when he twisted his ankle. He has been ambulating on the ankle but says that the pain is worsening, now moderate to severe. He also says that there is swelling in the ankle now 2. He has not been taking anything for it, nothing makes it better. He called 911 for transport to the hospital for evaluation because he is worried he might have broken it. Denies any numbness or tingling in the foot. No other injury from the incident. No other medical complaints today. PCP: None    Current Outpatient Medications   Medication Sig Dispense Refill    methocarbamoL (Robaxin) 500 mg tablet Take 2 Tablets by mouth three (3) times daily. 30 Tablet 0    LOSARTAN PO Take  by mouth. sertraline (Zoloft) 100 mg tablet Take  by mouth daily. atorvastatin (LIPITOR) 10 mg tablet Take  by mouth daily. metFORMIN (GLUCOPHAGE) 500 mg tablet Take  by mouth two (2) times daily (with meals). pantoprazole (Protonix) 20 mg tablet Take 20 mg by mouth daily. ondansetron hcl (Zofran) 4 mg tablet Take 1 Tablet by mouth every eight (8) hours as needed for Nausea. 12 Tablet 0    dicyclomine (BENTYL) 10 mg capsule Take 2 Capsules by mouth four (4) times daily as needed for Abdominal Cramps. 50 Capsule 0       Past History     Past Medical History:  Past Medical History:   Diagnosis Date    Depression     Diabetes ([facility] [address]     Gastric reflux     Hx of colonoscopy     Hypertension        Past Surgical History:  History reviewed.  No pertinent surgical history. Family History:  History reviewed. No pertinent family history. Social History:  Social History     Tobacco Use    Smoking status: Every Day     Types: Cigarettes    Smokeless tobacco: Never   Substance Use Topics    Alcohol use: Yes     Comment: heavy use    Drug use: Yes     Types: Marijuana       Allergies: Allergies   Allergen Reactions    Garlic Other (comments)       I reviewed and confirmed the above information with patient and updated as necessary. Review of Systems     Review of Systems   Constitutional:  Negative for diaphoresis and fever. HENT:  Negative for ear pain and sore throat. Eyes:         No acute change in vision   Respiratory:  Negative for cough and shortness of breath. Cardiovascular:  Negative for chest pain and leg swelling. Gastrointestinal:  Negative for abdominal pain and vomiting. Genitourinary:  Negative for dysuria. Musculoskeletal:  Positive for arthralgias and joint swelling. Negative for neck pain. Skin:  Negative for wound. Neurological:  Negative for weakness and headaches. Physical Exam   Visit Vitals  /74   Pulse (!) 119   Temp 98.2 °F (36.8 °C)   Resp 20   Ht 5' 3\" (1.6 m)   Wt 63.5 kg (140 lb)   SpO2 96%   BMI 24.80 kg/m²       Physical Exam  Vitals and nursing note reviewed. Constitutional:       Appearance: Normal appearance. He is not ill-appearing. Comments: Intoxicated adult male, lying comfortably, nondistressed   HENT:      Mouth/Throat:      Mouth: Mucous membranes are moist.   Eyes:      Pupils: Pupils are equal, round, and reactive to light. Cardiovascular:      Rate and Rhythm: Regular rhythm. Tachycardia present. Pulses: Normal pulses. Heart sounds: Normal heart sounds. Pulmonary:      Effort: Pulmonary effort is normal.      Breath sounds: Normal breath sounds. Abdominal:      General: Abdomen is flat. Tenderness: There is no abdominal tenderness.    Musculoskeletal:         General: Swelling and tenderness (Tender to palpation, maximally at the right medial malleolus of the ankle) present. No deformity. Normal range of motion. Cervical back: Normal range of motion. Skin:     General: Skin is warm. Neurological:      General: No focal deficit present. Mental Status: He is alert and oriented to person, place, and time. Diagnostic Study Results     Labs -  No results found for this or any previous visit (from the past 24 hour(s)). Radiologic Studies -   XR ANKLE RT MIN 3 V    (Results Pending)       Medical Decision Making   I am the first provider for this patient. I reviewed the vital signs, available nursing notes, past medical history, past surgical history, family history and social history. Vital Signs-Reviewed the patient's vital signs. Records Reviewed: Nursing Notes and Old Medical Records (Time of Review: 3:28 AM)    Provider Notes (Medical Decision Making):   MDM  27-year-old male here for evaluation of ankle pain consider fracture, sprain, dislocation    ED Course: Progress Notes, Reevaluation, and Consults:  Patient is afebrile, slight tachycardic, otherwise hemodynamically normal  Will obtain x-ray, treat pain    X-ray shows a partially displaced distal fibular fracture that appears to be healing, by chart review patient had a visit to the [facility] ER after a fall about 2 weeks ago with right ankle pain, did not receive x-rays because the left after refusing x-rays. This is likely the time of injury, will not reset the distal fibula, will place in a splint, have him follow-up with orthopedic surgery. Splint was applied, good pulses in the foot afterwards, signs and symptoms prompt return to the ED were discussed. Patient will take Motrin and Tylenol to manage his pain, given his multiple recent falls feel that prescribing a narcotic pain medicine would overall worsen his clinical status.     Sugar-tong splint applied, patient tolerated well, strong DP and PT pulses prior to the procedure, DP pulse palpated afterwards          Diagnosis     Clinical Impression:   1. Other closed fracture of distal end of right fibula, initial encounter        Disposition: Home    Follow-up Information       Follow up With Specialties Details Why Contact Info    Patricia Nelson MD Orthopedic Surgery Schedule an appointment as soon as possible for a visit   [address]      [facility] Emergency Medicine  As needed, If symptoms worsen [address] [address] [phone]             Patient's Medications   Start Taking    No medications on file   Continue Taking    ATORVASTATIN (LIPITOR) 10 MG TABLET    Take  by mouth daily. DICYCLOMINE (BENTYL) 10 MG CAPSULE    Take 2 Capsules by mouth four (4) times daily as needed for Abdominal Cramps. LOSARTAN PO    Take  by mouth. METFORMIN (GLUCOPHAGE) 500 MG TABLET    Take  by mouth two (2) times daily (with meals). METHOCARBAMOL (ROBAXIN) 500 MG TABLET    Take 2 Tablets by mouth three (3) times daily. ONDANSETRON HCL (ZOFRAN) 4 MG TABLET    Take 1 Tablet by mouth every eight (8) hours as needed for Nausea. PANTOPRAZOLE (PROTONIX) 20 MG TABLET    Take 20 mg by mouth daily. SERTRALINE (ZOLOFT) 100 MG TABLET    Take  by mouth daily. These Medications have changed    No medications on file   Stop Taking    No medications on file       Casandra Gamez MD   Emergency Medicine   September 4, 2022, 3:28 AM     This note is dictated utilizing Dragon voice recognition software. Unfortunately this leads to occasional typographical errors using the voice recognition. I apologize in advance if the situation occurs. If questions occur please do not hesitate to contact me directly.     Navjot Cabrera MD

## 2022-09-04 NOTE — DISCHARGE INSTRUCTIONS
You fractured the small bone in your right ankle, you will need to follow-up with orthopedic surgery for this. Keep the splint in place until you see the orthopedic surgeon. You cannot get the splint wet, if you need to shower please use a garbage bag and duct tape to make sure that it stays dry. Rotate between Motrin and Tylenol to manage your pain, taking one of the medications every 6-8 hours.

## 2022-09-05 NOTE — ED TRIAGE NOTES
Pt to ED for reports of R ankle pain.  Patient diagnosed with R ankle fracture last night; pt unable to fill his pain medication prescription

## 2022-09-05 NOTE — ED PROVIDER NOTES
EMERGENCY DEPARTMENT HISTORY AND PHYSICAL EXAM    Date: 9/4/2022  Patient Name: Nicolasa Prajapati    History of Presenting Illness     Chief Complaint   Patient presents with    Ankle Pain         History Provided By: Patient    Chief Complaint: ankle pain       Additional History (Context):   10:58 PM  Nicolasa Prajapati is a 50 y.o. male with PMHX abuse, diabetes, hypertension, recent right ankle fracture presents to the emergency department C/O right ankle pain. Patient was seen at this emergency department yesterday and diagnosed with old healing ankle fracture. Placed in short leg splint and stirrup splint given crutches. He had a prescription for 6 Percocet written and sent to the pharmacy which patient did not . Denies new injury to the area. No tingling or numbness to the area. PCP: None    Current Outpatient Medications   Medication Sig Dispense Refill    oxyCODONE-acetaminophen (Percocet) 5-325 mg per tablet Take 1 Tablet by mouth every six (6) hours as needed for Pain for up to 3 days. Max Daily Amount: 4 Tablets. 6 Tablet 0    methocarbamoL (Robaxin) 500 mg tablet Take 2 Tablets by mouth three (3) times daily. 30 Tablet 0    LOSARTAN PO Take  by mouth. sertraline (Zoloft) 100 mg tablet Take  by mouth daily. atorvastatin (LIPITOR) 10 mg tablet Take  by mouth daily. metFORMIN (GLUCOPHAGE) 500 mg tablet Take  by mouth two (2) times daily (with meals). pantoprazole (Protonix) 20 mg tablet Take 20 mg by mouth daily. ondansetron hcl (Zofran) 4 mg tablet Take 1 Tablet by mouth every eight (8) hours as needed for Nausea. 12 Tablet 0    dicyclomine (BENTYL) 10 mg capsule Take 2 Capsules by mouth four (4) times daily as needed for Abdominal Cramps. 50 Capsule 0       Past History     Past Medical History:  Past Medical History:   Diagnosis Date    Depression     Diabetes ([facility] [address]     Gastric reflux     Hx of colonoscopy     Hypertension        Past Surgical History:  History reviewed.  No pertinent surgical history. Family History:  History reviewed. No pertinent family history. Social History:  Social History     Tobacco Use    Smoking status: Every Day     Types: Cigarettes    Smokeless tobacco: Never   Substance Use Topics    Alcohol use: Yes     Comment: heavy use    Drug use: Yes     Types: Marijuana       Allergies: Allergies   Allergen Reactions    Garlic Other (comments)       Review of Systems   Review of Systems   Constitutional:  Negative for chills and fever. Respiratory:  Negative for shortness of breath. Cardiovascular:  Negative for chest pain. Musculoskeletal:  Positive for arthralgias (right ankle). Skin:  Negative for wound. Neurological:  Negative for weakness and numbness. All other systems reviewed and are negative. Physical Exam     Vitals:    09/04/22 2258   BP: 125/85   Pulse: (!) 110   Resp: 20   SpO2: 99%   Weight: 63.5 kg (140 lb)   Height: 5' 4\" (1.626 m)     Physical Exam  Vitals and nursing note reviewed. Constitutional:       Appearance: He is well-developed. HENT:      Head: Normocephalic and atraumatic. Cardiovascular:      Rate and Rhythm: Normal rate and regular rhythm. Heart sounds: Normal heart sounds. No murmur heard. Pulmonary:      Effort: Pulmonary effort is normal. No respiratory distress. Breath sounds: Normal breath sounds. No wheezing or rales. Abdominal:      General: Bowel sounds are normal.      Palpations: Abdomen is soft. Tenderness: There is no abdominal tenderness. Musculoskeletal:      Cervical back: Normal range of motion and neck supple. Comments: Right lower leg in splint, brisk cap refill to toes sensation intact distally  Splint removed  diffuse tenderness to the ankle and proximal fibula  2+ for DP and PT   Neurological:      Mental Status: He is alert and oriented to person, place, and time.    Psychiatric:         Judgment: Judgment normal.         Diagnostic Study Results     Labs:   No results found for this or any previous visit (from the past 12 hour(s)). Radiologic Studies:   XR TIB/FIB RT    (Results Pending)     CT Results  (Last 48 hours)      None          CXR Results  (Last 48 hours)      None            Medical Decision Making   I am the first provider for this patient. I reviewed the vital signs, available nursing notes, past medical history, past surgical history, family history and social history. Vital Signs: Reviewed the patient's vital signs. Pulse Oximetry Analysis: 99% on RA         Records Reviewed: Nursing Notes and Old Medical Records    Procedures:  Procedures    ED Course:   10:58 PM Initial assessment performed. The patients presenting problems have been discussed, and they are in agreement with the care plan formulated and outlined with them. I have encouraged them to ask questions as they arise throughout their visit. Discussion:  Pt presents with ankle pain. Patient was seen in this ED last night for the same complaint. X-ray of right ankle showed healing fracture of the distal lateral malleolus. Splint was still in place but appears to have been walked on. He was prescribed 6 Percocet yesterday but did not  the prescription. No new injury. Removed and ankle examined no skin breakdown neurovascularly intact. He was having some tenderness to the proximal fibula on exam therefore x-ray obtained that shows no acute process pending review by radiology. Splint replaced stressed the importance of following up with Ortho staying off the foot and picking up his prescription for pain in order to have pain relief. Strict return precautions given, pt offering no questions or complaints. Diagnosis and Disposition     DISCHARGE NOTE:  Lynda Napoles  results have been reviewed with him. He has been counseled regarding his diagnosis, treatment, and plan.   He verbally conveys understanding and agreement of the signs, symptoms, diagnosis, treatment and prognosis and additionally agrees to follow up as discussed. He also agrees with the care-plan and conveys that all of his questions have been answered. I have also provided discharge instructions for him that include: educational information regarding their diagnosis and treatment, and list of reasons why they would want to return to the ED prior to their follow-up appointment, should his condition change. He has been provided with education for proper emergency department utilization. CLINICAL IMPRESSION:    1. Closed fracture of right ankle with routine healing, subsequent encounter    2. Non compliance w medication regimen        PLAN:  1. D/C Home  2. Current Discharge Medication List        3. Follow-up Information       Follow up With Specialties Details Why Contact Info    Char Modi MD Orthopedic Surgery Schedule an appointment as soon as possible for a visit   [facility] [address] [id number] [phone]      [facility] EMERGENCY DEPT Emergency Medicine  If symptoms worsen [address] [facility] [address] [phone]                   Please note that this dictation was completed with [employer], the computer voice recognition software. Quite often unanticipated grammatical, syntax, homophones, and other interpretive errors are inadvertently transcribed by the computer software. Please disregard these errors. Please excuse any errors that have escaped final proofreading.

## 2022-09-09 VITALS
SYSTOLIC BLOOD PRESSURE: 137 MMHG | RESPIRATION RATE: 18 BRPM | BODY MASS INDEX: 23.9 KG/M2 | HEART RATE: 106 BPM | TEMPERATURE: 98.1 F | WEIGHT: 140 LBS | HEIGHT: 64 IN | DIASTOLIC BLOOD PRESSURE: 93 MMHG | OXYGEN SATURATION: 100 %

## 2022-09-09 PROCEDURE — 99283 EMERGENCY DEPT VISIT LOW MDM: CPT

## 2022-09-10 ENCOUNTER — HOSPITAL ENCOUNTER (EMERGENCY)
Age: 47
Discharge: HOME | End: 2022-09-10
Attending: EMERGENCY MEDICINE
Payer: MEDICAID

## 2022-09-10 ENCOUNTER — APPOINTMENT (OUTPATIENT)
Dept: GENERAL RADIOLOGY | Age: 47
End: 2022-09-10
Attending: PHYSICIAN ASSISTANT
Payer: MEDICAID

## 2022-09-10 DIAGNOSIS — S82.831A OTHER CLOSED FRACTURE OF DISTAL END OF RIGHT FIBULA, INITIAL ENCOUNTER: ICD-10-CM

## 2022-09-10 DIAGNOSIS — T14.8XXA CLOSED FRACTURE: Primary | ICD-10-CM

## 2022-09-10 PROCEDURE — 73610 X-RAY EXAM OF ANKLE: CPT

## 2022-09-10 RX ORDER — IBUPROFEN 800 MG/1
800 TABLET ORAL EVERY 8 HOURS
Qty: 15 TABLET | Refills: 0 | Status: SHIPPED | OUTPATIENT
Start: 2022-09-10 | End: 2022-09-15

## 2022-09-10 NOTE — ED TRIAGE NOTES
Pt arrives to ER via ambulance w c/o right ankle pain, known fracture. Seen for same x 2. Pt again has not filled his prescriptions for pain control.

## 2022-09-17 ENCOUNTER — HOSPITAL ENCOUNTER (EMERGENCY)
Age: 47
Discharge: HOME | End: 2022-09-17
Attending: EMERGENCY MEDICINE
Payer: MEDICAID

## 2022-09-17 VITALS
HEART RATE: 97 BPM | TEMPERATURE: 97.6 F | SYSTOLIC BLOOD PRESSURE: 123 MMHG | DIASTOLIC BLOOD PRESSURE: 87 MMHG | OXYGEN SATURATION: 100 % | RESPIRATION RATE: 16 BRPM | WEIGHT: 110 LBS | BODY MASS INDEX: 18.88 KG/M2

## 2022-09-17 DIAGNOSIS — S82.831G OTHER CLOSED FRACTURE OF DISTAL END OF RIGHT FIBULA WITH DELAYED HEALING, SUBSEQUENT ENCOUNTER: Primary | ICD-10-CM

## 2022-09-17 PROCEDURE — 99283 EMERGENCY DEPT VISIT LOW MDM: CPT

## 2022-09-17 PROCEDURE — 74011250637 HC RX REV CODE- 250/637: Performed by: EMERGENCY MEDICINE

## 2022-09-17 RX ORDER — HYDROCODONE BITARTRATE AND ACETAMINOPHEN 5; 325 MG/1; MG/1
1 TABLET ORAL
Status: COMPLETED | OUTPATIENT
Start: 2022-09-17 | End: 2022-09-17

## 2022-09-17 RX ORDER — HYDROCODONE BITARTRATE AND ACETAMINOPHEN 5; 325 MG/1; MG/1
1 TABLET ORAL
Qty: 10 TABLET | Refills: 0 | Status: SHIPPED | OUTPATIENT
Start: 2022-09-17 | End: 2022-09-20

## 2022-09-17 RX ADMIN — HYDROCODONE BITARTRATE AND ACETAMINOPHEN 1 TABLET: 5; 325 TABLET ORAL at 04:46

## 2022-09-17 NOTE — ED NOTES
Pt came out of room, walking with steady gait, to demand pain medication. Pt states,\"What's taking so long? Im in pain! I need my pain medicine! \"    Pt educated that there is no pain meds ordered for him at this time, and that he needs to be evaluated by a provider.

## 2022-09-17 NOTE — ED TRIAGE NOTES
Weeks ago causing a fx in right ankle. Pt states the pain is worse tonight and he doesn't have anymore narcotics for pain. Pt states he hasn't followed up with ortho bc of transportation.  Pt had no boot or crutches on arrival. Vss nad noted

## 2022-09-25 NOTE — ED PROVIDER NOTES
EMERGENCY DEPARTMENT HISTORY AND PHYSICAL EXAM    Date: 9/17/2022  Patient Name: Mayra Momin    History of Presenting Illness     Chief Complaint   Patient presents with    Ankle Pain         History Provided By: Patient and EMS    Additional History (Context):    4:15 AM  Mayra Momin is a 50 y.o. male with PMHX of multiple recurrent visits for alcohol abuse, depression, violent behavior, previous fracture who presents to the emergency department C/O pain treatment for his broken leg. He had a set of the diagnosis of fracture distal fibula first reported on September 4. He has been splinted but has not followed up with orthopedics. He is arrived here several times even walking about on his splint. He returns by paramedics agitated walking on his broken leg screaming to nursing staff for immediate treatment for his pain condition. He knowledges drinking alcohol seems to be agitated and does not give any further history despite repeated questions. He denies any other drug use. Social History  Acknowledges alcohol use. Also reported regular use of cigarettes and marijuana. Family History  Does not answer questions regarding family history. PCP: None    Current Outpatient Medications   Medication Sig Dispense Refill    methocarbamoL (Robaxin) 500 mg tablet Take 2 Tablets by mouth three (3) times daily. 30 Tablet 0    LOSARTAN PO Take  by mouth. sertraline (ZOLOFT) 100 mg tablet Take  by mouth daily. atorvastatin (LIPITOR) 10 mg tablet Take  by mouth daily. metFORMIN (GLUCOPHAGE) 500 mg tablet Take  by mouth two (2) times daily (with meals). pantoprazole (Protonix) 20 mg tablet Take 20 mg by mouth daily. ondansetron hcl (Zofran) 4 mg tablet Take 1 Tablet by mouth every eight (8) hours as needed for Nausea. 12 Tablet 0    dicyclomine (BENTYL) 10 mg capsule Take 2 Capsules by mouth four (4) times daily as needed for Abdominal Cramps.  50 Capsule 0       Past History     Past Medical History:  Past Medical History:   Diagnosis Date    Depression     Diabetes ([facility] [address]     Gastric reflux     Hx of colonoscopy     Hypertension        Past Surgical History:  No past surgical history on file. Family History:  No family history on file. Social History:  Social History     Tobacco Use    Smoking status: Every Day     Types: Cigarettes    Smokeless tobacco: Never   Substance Use Topics    Alcohol use: Yes     Comment: heavy use    Drug use: Yes     Types: Marijuana       Allergies: Allergies   Allergen Reactions    Garlic Other (comments)         Review of Systems   Review of Systems   Musculoskeletal:  Positive for arthralgias. Psychiatric/Behavioral:  Positive for agitation and behavioral problems. All other systems reviewed and are negative. Physical Exam     Vitals:    09/17/22 0227 09/17/22 0357   BP: 123/87    Pulse: 97    Resp: 16    Temp: 97.6 °F (36.4 °C)    SpO2: 100% 100%   Weight: 49.9 kg (110 lb)      Physical Exam  Vitals and nursing note reviewed. Constitutional:       General: He is not in acute distress. Appearance: He is well-developed. He is not diaphoretic. Comments: Disheveled appearance, smells of alcohol and smoke   HENT:      Head: Normocephalic and atraumatic. Eyes:      General: No scleral icterus. Extraocular Movements:      Right eye: Normal extraocular motion. Left eye: Normal extraocular motion. Conjunctiva/sclera: Conjunctivae normal.      Pupils: Pupils are equal, round, and reactive to light. Neck:      Trachea: No tracheal deviation. Cardiovascular:      Rate and Rhythm: Normal rate and regular rhythm. Heart sounds: Normal heart sounds. Pulmonary:      Effort: Pulmonary effort is normal. No respiratory distress. Breath sounds: Normal breath sounds. No stridor. Abdominal:      General: Bowel sounds are normal. There is no distension. Palpations: Abdomen is soft. Tenderness:  There is no abdominal tenderness. There is no rebound. Musculoskeletal:         General: No tenderness. Normal range of motion. Cervical back: Normal range of motion and neck supple. Comments: Tender right ankle diffusely. No swelling or color changes. Pulses are normal.  Cap refills normal.   Skin:     General: Skin is warm and dry. Capillary Refill: Capillary refill takes less than 2 seconds. Findings: No erythema or rash. Neurological:      Mental Status: He is alert. Cranial Nerves: No cranial nerve deficit. Motor: No weakness. Comments: Alert oriented mildly agitated. He does respond to direction. Psychiatric:         Attention and Perception: Attention normal.         Mood and Affect: Affect is angry. Speech: Speech is rapid and pressured. Behavior: Behavior normal. Behavior is cooperative. Thought Content: Thought content does not include homicidal or suicidal ideation. Diagnostic Study Results     Labs -  No results found for this or any previous visit (from the past 24 hour(s)). Radiologic Studies -no new pictures taken. No orders to display     CT Results  (Last 48 hours)      None          CXR Results  (Last 48 hours)      None            Medications given in the ED-  Medications   HYDROcodone-acetaminophen (NORCO) 5-325 mg per tablet 1 Tablet (1 Tablet Oral Given 9/17/22 0446)         Medical Decision Making   I am the first provider for this patient. I reviewed the vital signs, available nursing notes, past medical history, past surgical history, family history and social history. Vital Signs-Reviewed the patient's vital signs. Pulse Oximetry Analysis - 100% on room air    Records Reviewed: NURSING NOTES AND PREVIOUS MEDICAL RECORDS    Provider Notes (Medical Decision Making): We did provide him pain medication. He allowed me to order him a walking boot which would probably be better for the distal fibula.   He was encouraged to follow-up with orthopedics. Unlikely there is no fracture or vascular injury by examination. Procedures:  Procedures    ED Course:   4:15 AM : Initial assessment performed. The patients presenting problems have been discussed, and they are in agreement with the care plan formulated and outlined with them. I have encouraged them to ask questions as they arise throughout their visit. Diagnosis and Disposition       DISCHARGE NOTE:  4:40 AM  Lolly Christopher  results have been reviewed with him. He has been counseled regarding his diagnosis, treatment, and plan. He verbally conveys understanding and agreement of the signs, symptoms, diagnosis, treatment and prognosis and additionally agrees to follow up as discussed. He also agrees with the care-plan and conveys that all of his questions have been answered. I have also provided discharge instructions for him that include: educational information regarding their diagnosis and treatment, and list of reasons why they would want to return to the ED prior to their follow-up appointment, should his condition change. He has been provided with education for proper emergency department utilization. CLINICAL IMPRESSION:    1. Other closed fracture of distal end of right fibula with delayed healing, subsequent encounter        PLAN:  1. D/C Home  2. Discharge Medication List as of 9/17/2022  4:43 AM        START taking these medications    Details   HYDROcodone-acetaminophen (Norco) 5-325 mg per tablet Take 1 Tablet by mouth every six (6) hours as needed for Pain for up to 3 days. Max Daily Amount: 4 Tablets., Normal, Disp-10 Tablet, R-0           CONTINUE these medications which have NOT CHANGED    Details   methocarbamoL (Robaxin) 500 mg tablet Take 2 Tablets by mouth three (3) times daily. , Normal, Disp-30 Tablet, R-0      LOSARTAN PO Take  by mouth., Historical Med      sertraline (ZOLOFT) 100 mg tablet Take  by mouth daily. , Historical Med      atorvastatin (LIPITOR) 10 mg tablet Take  by mouth daily. , Historical Med      metFORMIN (GLUCOPHAGE) 500 mg tablet Take  by mouth two (2) times daily (with meals). , Historical Med      pantoprazole (Protonix) 20 mg tablet Take 20 mg by mouth daily. , Historical Med      ondansetron hcl (Zofran) 4 mg tablet Take 1 Tablet by mouth every eight (8) hours as needed for Nausea., Normal, Disp-12 Tablet, R-0      dicyclomine (BENTYL) 10 mg capsule Take 2 Capsules by mouth four (4) times daily as needed for Abdominal Cramps., Normal, Disp-50 Capsule, R-0           3. Follow-up Information       Follow up With Specialties Details Why Contact Info    Parul Geiger MD Orthopedic Surgery Schedule an appointment as soon as possible for a visit   [address] [place] [address]  [phone]            _______________________________    This note was partially transcribed via voice recognition software. Although efforts have been made to catch any discrepancies, it may contain sound alike words, grammatical errors, or nonsensical words.

## 2022-09-26 NOTE — ED PROVIDER NOTES
EMERGENCY DEPARTMENT HISTORY AND PHYSICAL EXAM      Date: 9/10/2022  Patient Name: Carlton Watson    History of Presenting Illness     Chief Complaint   Patient presents with    Ankle Pain       History Provided By: Patient    HPI: Carlton Watson is a 50 y.o. male with PMHX abuse, diabetes, hypertension, a mental health disorder, recent right ankle fracture, has been seen in ED twice over the last week for same, returns via EMS complaining of pain. He was placed in  short leg splint and stirrup splint given crutches on last visit and also prescribed pain medications. He has not filled his prescriptions and he has removed his splint and is ambulating well into the ED with EMS. There are no other complaints, changes, or physical findings at this time. PCP: None    No current facility-administered medications on file prior to encounter. Current Outpatient Medications on File Prior to Encounter   Medication Sig Dispense Refill    methocarbamoL (Robaxin) 500 mg tablet Take 2 Tablets by mouth three (3) times daily. 30 Tablet 0    LOSARTAN PO Take  by mouth. sertraline (ZOLOFT) 100 mg tablet Take  by mouth daily. atorvastatin (LIPITOR) 10 mg tablet Take  by mouth daily. metFORMIN (GLUCOPHAGE) 500 mg tablet Take  by mouth two (2) times daily (with meals). pantoprazole (Protonix) 20 mg tablet Take 20 mg by mouth daily. ondansetron hcl (Zofran) 4 mg tablet Take 1 Tablet by mouth every eight (8) hours as needed for Nausea. 12 Tablet 0    dicyclomine (BENTYL) 10 mg capsule Take 2 Capsules by mouth four (4) times daily as needed for Abdominal Cramps. 50 Capsule 0       Past History     Past Medical History:  Past Medical History:   Diagnosis Date    Depression     Diabetes ([address]     Gastric reflux     Hx of colonoscopy     Hypertension        Past Surgical History:  History reviewed. No pertinent surgical history. Family History:  History reviewed. No pertinent family history.     Social History:  Social History     Tobacco Use    Smoking status: Every Day     Types: Cigarettes    Smokeless tobacco: Never   Substance Use Topics    Alcohol use: Yes     Comment: heavy use    Drug use: Yes     Types: Marijuana       Allergies: Allergies   Allergen Reactions    Garlic Other (comments)       Review of Systems   Review of Systems   All other systems reviewed and are negative. Physical Exam   Physical Exam  Vitals and nursing note reviewed. Constitutional:       Comments: Appears in no acute distress and appears in no pain and ambulates well. HENT:      Head: Normocephalic and atraumatic. Eyes:      Conjunctiva/sclera: Conjunctivae normal.   Cardiovascular:      Heart sounds: Normal heart sounds. Pulmonary:      Breath sounds: Normal breath sounds. Abdominal:      General: Bowel sounds are normal.      Palpations: Abdomen is soft. Musculoskeletal:         General: Swelling and tenderness present. Comments: And the right ankle has some minimal swelling, there is some tenderness to palpation of the ankle. The foot is neurovascularly intact. Lab and Diagnostic Study Results   Labs -   No results found for this or any previous visit (from the past 12 hour(s)). Radiologic Studies -   @lastxrresult@  CT Results  (Last 48 hours)      None          CXR Results  (Last 48 hours)      None            Medical Decision Making and ED Course   Differential Diagnosis & Medical Decision Making Provider Note:       - I am the first provider for this patient. I reviewed the vital signs, available nursing notes, past medical history, past surgical history, family history and social history. The patients presenting problems have been discussed, and they are in agreement with the care plan formulated and outlined with them. I have encouraged them to ask questions as they arise throughout their visit. Vital Signs-Reviewed the patient's vital signs. No data found. ED Course:       The right foot appears neurovascularly intact, patient appears ambulating well, area stressed to patient need for following up with Ortho and also feeling prescription given before. Procedures     Disposition   Disposition: Condition stable  DC- Adult Discharges: All of the diagnostic tests were reviewed and questions answered. Diagnosis, care plan and treatment options were discussed. The patient understands the instructions and will follow up as directed. The patients results have been reviewed with them. They have been counseled regarding their diagnosis. The patient verbally convey understanding and agreement of the signs, symptoms, diagnosis, treatment and prognosis and additionally agrees to follow up as recommended with their PCP in 24 - 48 hours. They also agree with the care-plan and convey that all of their questions have been answered. I have also put together some discharge instructions for them that include: 1) educational information regarding their diagnosis, 2) how to care for their diagnosis at home, as well a 3) list of reasons why they would want to return to the ED prior to their follow-up appointment, should their condition change. DISCHARGE PLAN:  1. Cannot display discharge medications since this patient is not currently admitted. 2.   Follow-up Information       Follow up With Specialties Details Why Contact Info    Nayla Horton MD Orthopedic Surgery In 3 days  [address] [facility]  [phone]            3. Return to ED if worse   4. Discharge Medication List as of 9/10/2022  4:38 AM        START taking these medications    Details   ibuprofen (MOTRIN) 800 mg tablet Take 1 Tablet by mouth every eight (8) hours for 5 days. , Normal, Disp-15 Tablet, R-0           CONTINUE these medications which have NOT CHANGED    Details   methocarbamoL (Robaxin) 500 mg tablet Take 2 Tablets by mouth three (3) times daily. , Normal, Disp-30 Tablet, R-0 LOSARTAN PO Take  by mouth., Historical Med      sertraline (ZOLOFT) 100 mg tablet Take  by mouth daily. , Historical Med      atorvastatin (LIPITOR) 10 mg tablet Take  by mouth daily. , Historical Med      metFORMIN (GLUCOPHAGE) 500 mg tablet Take  by mouth two (2) times daily (with meals). , Historical Med      pantoprazole (Protonix) 20 mg tablet Take 20 mg by mouth daily. , Historical Med      ondansetron hcl (Zofran) 4 mg tablet Take 1 Tablet by mouth every eight (8) hours as needed for Nausea., Normal, Disp-12 Tablet, R-0      dicyclomine (BENTYL) 10 mg capsule Take 2 Capsules by mouth four (4) times daily as needed for Abdominal Cramps., Normal, Disp-50 Capsule, R-0            Remove if admitted/transferred    Diagnosis/Clinical Impression     Clinical Impression:   1. Closed fracture    2. Other closed fracture of distal end of right fibula, initial encounter        Attestations: Esa HECTOR MD, am the primary clinician of record. Please note that this dictation was completed with i-drive, the [employer] voice recognition software. Quite often unanticipated grammatical, syntax, homophones, and other interpretive errors are inadvertently transcribed by the computer software. Please disregard these errors. Please excuse any errors that have escaped final proofreading. Thank you.

## 2023-06-22 NOTE — ED PROVIDER NOTES
----- Message from Leoncio Bedoya sent at 6/22/2023  8:19 AM CDT -----  Contact: Pt  .Type:  Needs Medical Advice    Who Called: pt  Would the patient rather a call back or a response via [facility]?  Call back  Best Call Back Number: [phone]  Additional Information: Pt. Called will be about 10 minutes late for her appt. @8:30 a.m.        49 y/o M acutely intoxicated accepted at signout pending sobriety, pt requesting in patient detox    I have spoken to patient several times during his stay, he was also a patient treated for intoxication last night as well. He is asking for a 28 day in patient stay which this facility does not provide. Pt has been agitated and constantly requesting special meals.   He has no signs or symptoms of withdrawal.   Patient has resources to be safely discharged home and seek detox as an outpt    Discharged 38 y/o female presents to the ED c/o sudden chest pain since this [place] train. Describes burning sensation to face and inability to see this morning. +left hand heaviness, +SOB due to pain. Denies family hx of heart attacks. Pt has no other complaints and denies fever/chills, n/v/d and HA. +Dyspnea on exertion. Reports taking ASA this morning.